# Patient Record
Sex: MALE | Race: OTHER | HISPANIC OR LATINO
[De-identification: names, ages, dates, MRNs, and addresses within clinical notes are randomized per-mention and may not be internally consistent; named-entity substitution may affect disease eponyms.]

---

## 2017-01-20 ENCOUNTER — MEDICATION RENEWAL (OUTPATIENT)
Age: 57
End: 2017-01-20

## 2017-02-06 ENCOUNTER — APPOINTMENT (OUTPATIENT)
Dept: INTERNAL MEDICINE | Facility: CLINIC | Age: 57
End: 2017-02-06

## 2017-02-06 ENCOUNTER — OUTPATIENT (OUTPATIENT)
Dept: OUTPATIENT SERVICES | Facility: HOSPITAL | Age: 57
LOS: 1 days | End: 2017-02-06
Payer: MEDICAID

## 2017-02-06 ENCOUNTER — RESULT CHARGE (OUTPATIENT)
Age: 57
End: 2017-02-06

## 2017-02-06 VITALS
HEART RATE: 76 BPM | WEIGHT: 192 LBS | BODY MASS INDEX: 26.78 KG/M2 | SYSTOLIC BLOOD PRESSURE: 138 MMHG | DIASTOLIC BLOOD PRESSURE: 70 MMHG

## 2017-02-06 VITALS — SYSTOLIC BLOOD PRESSURE: 125 MMHG | DIASTOLIC BLOOD PRESSURE: 80 MMHG

## 2017-02-06 DIAGNOSIS — I10 ESSENTIAL (PRIMARY) HYPERTENSION: ICD-10-CM

## 2017-02-06 LAB — HBA1C MFR BLD HPLC: 8.1

## 2017-02-06 PROCEDURE — 83036 HEMOGLOBIN GLYCOSYLATED A1C: CPT

## 2017-02-06 PROCEDURE — G0463: CPT

## 2017-02-08 DIAGNOSIS — E78.5 HYPERLIPIDEMIA, UNSPECIFIED: ICD-10-CM

## 2017-02-08 DIAGNOSIS — Z00.00 ENCOUNTER FOR GENERAL ADULT MEDICAL EXAMINATION WITHOUT ABNORMAL FINDINGS: ICD-10-CM

## 2017-02-08 DIAGNOSIS — K21.9 GASTRO-ESOPHAGEAL REFLUX DISEASE WITHOUT ESOPHAGITIS: ICD-10-CM

## 2017-02-08 DIAGNOSIS — E66.3 OVERWEIGHT: ICD-10-CM

## 2017-02-08 DIAGNOSIS — E11.9 TYPE 2 DIABETES MELLITUS WITHOUT COMPLICATIONS: ICD-10-CM

## 2017-03-23 ENCOUNTER — OUTPATIENT (OUTPATIENT)
Dept: OUTPATIENT SERVICES | Facility: HOSPITAL | Age: 57
LOS: 1 days | End: 2017-03-23
Payer: COMMERCIAL

## 2017-03-23 ENCOUNTER — APPOINTMENT (OUTPATIENT)
Dept: INTERNAL MEDICINE | Facility: CLINIC | Age: 57
End: 2017-03-23

## 2017-03-23 VITALS
HEART RATE: 79 BPM | BODY MASS INDEX: 27.34 KG/M2 | DIASTOLIC BLOOD PRESSURE: 79 MMHG | SYSTOLIC BLOOD PRESSURE: 130 MMHG | WEIGHT: 196 LBS

## 2017-03-23 DIAGNOSIS — I10 ESSENTIAL (PRIMARY) HYPERTENSION: ICD-10-CM

## 2017-03-23 PROCEDURE — G0463: CPT

## 2017-03-24 DIAGNOSIS — E11.9 TYPE 2 DIABETES MELLITUS WITHOUT COMPLICATIONS: ICD-10-CM

## 2017-06-05 ENCOUNTER — MEDICATION RENEWAL (OUTPATIENT)
Age: 57
End: 2017-06-05

## 2017-06-08 ENCOUNTER — APPOINTMENT (OUTPATIENT)
Dept: INTERNAL MEDICINE | Facility: CLINIC | Age: 57
End: 2017-06-08

## 2017-06-08 ENCOUNTER — OUTPATIENT (OUTPATIENT)
Dept: OUTPATIENT SERVICES | Facility: HOSPITAL | Age: 57
LOS: 1 days | End: 2017-06-08
Payer: COMMERCIAL

## 2017-06-08 ENCOUNTER — RESULT CHARGE (OUTPATIENT)
Age: 57
End: 2017-06-08

## 2017-06-08 VITALS
HEART RATE: 86 BPM | BODY MASS INDEX: 27.89 KG/M2 | SYSTOLIC BLOOD PRESSURE: 132 MMHG | WEIGHT: 200 LBS | OXYGEN SATURATION: 98 % | DIASTOLIC BLOOD PRESSURE: 74 MMHG

## 2017-06-08 DIAGNOSIS — Z87.19 PERSONAL HISTORY OF OTHER DISEASES OF THE DIGESTIVE SYSTEM: ICD-10-CM

## 2017-06-08 DIAGNOSIS — I10 ESSENTIAL (PRIMARY) HYPERTENSION: ICD-10-CM

## 2017-06-08 LAB — HBA1C MFR BLD HPLC: 7.5

## 2017-06-08 PROCEDURE — 83036 HEMOGLOBIN GLYCOSYLATED A1C: CPT

## 2017-06-08 PROCEDURE — G0463: CPT

## 2017-06-19 DIAGNOSIS — E78.5 HYPERLIPIDEMIA, UNSPECIFIED: ICD-10-CM

## 2017-06-19 DIAGNOSIS — E11.9 TYPE 2 DIABETES MELLITUS WITHOUT COMPLICATIONS: ICD-10-CM

## 2017-06-22 ENCOUNTER — OUTPATIENT (OUTPATIENT)
Dept: OUTPATIENT SERVICES | Facility: HOSPITAL | Age: 57
LOS: 1 days | End: 2017-06-22

## 2017-06-22 ENCOUNTER — APPOINTMENT (OUTPATIENT)
Dept: OPHTHALMOLOGY | Facility: CLINIC | Age: 57
End: 2017-06-22

## 2017-08-03 DIAGNOSIS — E13.9 OTHER SPECIFIED DIABETES MELLITUS WITHOUT COMPLICATIONS: ICD-10-CM

## 2017-10-17 ENCOUNTER — MEDICATION RENEWAL (OUTPATIENT)
Age: 57
End: 2017-10-17

## 2017-12-19 ENCOUNTER — MEDICATION RENEWAL (OUTPATIENT)
Age: 57
End: 2017-12-19

## 2018-01-02 ENCOUNTER — CHART COPY (OUTPATIENT)
Age: 58
End: 2018-01-02

## 2018-01-05 ENCOUNTER — LABORATORY RESULT (OUTPATIENT)
Age: 58
End: 2018-01-05

## 2018-01-05 ENCOUNTER — APPOINTMENT (OUTPATIENT)
Dept: INTERNAL MEDICINE | Facility: CLINIC | Age: 58
End: 2018-01-05

## 2018-01-05 ENCOUNTER — RESULT CHARGE (OUTPATIENT)
Age: 58
End: 2018-01-05

## 2018-01-05 ENCOUNTER — OUTPATIENT (OUTPATIENT)
Dept: OUTPATIENT SERVICES | Facility: HOSPITAL | Age: 58
LOS: 1 days | End: 2018-01-05
Payer: COMMERCIAL

## 2018-01-05 VITALS
SYSTOLIC BLOOD PRESSURE: 130 MMHG | BODY MASS INDEX: 27.02 KG/M2 | HEIGHT: 71 IN | DIASTOLIC BLOOD PRESSURE: 80 MMHG | WEIGHT: 193 LBS

## 2018-01-05 DIAGNOSIS — Z86.69 PERSONAL HISTORY OF OTHER DISEASES OF THE NERVOUS SYSTEM AND SENSE ORGANS: ICD-10-CM

## 2018-01-05 DIAGNOSIS — I10 ESSENTIAL (PRIMARY) HYPERTENSION: ICD-10-CM

## 2018-01-05 LAB
CREAT ?TM UR-MCNC: 55 MG/DL — SIGNIFICANT CHANGE UP
MICROALBUMIN UR-MCNC: 1.3 MG/DL — SIGNIFICANT CHANGE UP
MICROALBUMIN/CREAT UR-RTO: 24 MG/G — SIGNIFICANT CHANGE UP (ref 0–30)

## 2018-01-05 PROCEDURE — 83036 HEMOGLOBIN GLYCOSYLATED A1C: CPT

## 2018-01-05 PROCEDURE — G0463: CPT

## 2018-01-05 PROCEDURE — 82043 UR ALBUMIN QUANTITATIVE: CPT

## 2018-01-11 DIAGNOSIS — E11.9 TYPE 2 DIABETES MELLITUS WITHOUT COMPLICATIONS: ICD-10-CM

## 2018-01-11 DIAGNOSIS — E78.5 HYPERLIPIDEMIA, UNSPECIFIED: ICD-10-CM

## 2018-06-04 ENCOUNTER — RX RENEWAL (OUTPATIENT)
Age: 58
End: 2018-06-04

## 2018-06-22 ENCOUNTER — RX RENEWAL (OUTPATIENT)
Age: 58
End: 2018-06-22

## 2018-10-02 ENCOUNTER — RX RENEWAL (OUTPATIENT)
Age: 58
End: 2018-10-02

## 2018-10-24 ENCOUNTER — RX RENEWAL (OUTPATIENT)
Age: 58
End: 2018-10-24

## 2018-11-09 ENCOUNTER — OUTPATIENT (OUTPATIENT)
Dept: OUTPATIENT SERVICES | Facility: HOSPITAL | Age: 58
LOS: 1 days | End: 2018-11-09
Payer: COMMERCIAL

## 2018-11-09 ENCOUNTER — APPOINTMENT (OUTPATIENT)
Dept: INTERNAL MEDICINE | Facility: CLINIC | Age: 58
End: 2018-11-09

## 2018-11-09 ENCOUNTER — RESULT CHARGE (OUTPATIENT)
Age: 58
End: 2018-11-09

## 2018-11-09 VITALS
HEIGHT: 70 IN | DIASTOLIC BLOOD PRESSURE: 70 MMHG | SYSTOLIC BLOOD PRESSURE: 130 MMHG | WEIGHT: 200 LBS | BODY MASS INDEX: 28.63 KG/M2

## 2018-11-09 DIAGNOSIS — Z00.00 ENCOUNTER FOR GENERAL ADULT MEDICAL EXAMINATION W/OUT ABNORMAL FINDINGS: ICD-10-CM

## 2018-11-09 DIAGNOSIS — I10 ESSENTIAL (PRIMARY) HYPERTENSION: ICD-10-CM

## 2018-11-09 LAB — HBA1C MFR BLD HPLC: 7.2

## 2018-11-09 PROCEDURE — G0463: CPT

## 2018-11-09 PROCEDURE — 83036 HEMOGLOBIN GLYCOSYLATED A1C: CPT

## 2018-11-09 RX ORDER — ASPIRIN ENTERIC COATED TABLETS 81 MG 81 MG/1
81 TABLET, DELAYED RELEASE ORAL DAILY
Qty: 30 | Refills: 3 | Status: DISCONTINUED | COMMUNITY
Start: 2018-01-05 | End: 2018-11-09

## 2018-11-09 RX ORDER — GLIMEPIRIDE 4 MG/1
4 TABLET ORAL DAILY
Qty: 30 | Refills: 2 | Status: DISCONTINUED | COMMUNITY
Start: 2017-02-06 | End: 2018-11-09

## 2018-11-09 NOTE — PHYSICAL EXAM
[No Acute Distress] : no acute distress [Well-Appearing] : well-appearing [Normal Sclera/Conjunctiva] : normal sclera/conjunctiva [PERRL] : pupils equal round and reactive to light [EOMI] : extraocular movements intact [Normal Outer Ear/Nose] : the outer ears and nose were normal in appearance [Normal Oropharynx] : the oropharynx was normal [Supple] : supple [Clear to Auscultation] : lungs were clear to auscultation bilaterally [Normal Rate] : normal rate  [Regular Rhythm] : with a regular rhythm [Normal S1, S2] : normal S1 and S2 [No Murmur] : no murmur heard [Pedal Pulses Present] : the pedal pulses are present [No Edema] : there was no peripheral edema [Soft] : abdomen soft [Non Tender] : non-tender [Non-distended] : non-distended [Normal Supraclavicular Nodes] : no supraclavicular lymphadenopathy [Normal Posterior Cervical Nodes] : no posterior cervical lymphadenopathy [Normal Anterior Cervical Nodes] : no anterior cervical lymphadenopathy [No CVA Tenderness] : no CVA  tenderness [No Joint Swelling] : no joint swelling [Grossly Normal Strength/Tone] : grossly normal strength/tone [No Skin Lesions] : no skin lesions [Normal Gait] : normal gait [Coordination Grossly Intact] : coordination grossly intact [No Focal Deficits] : no focal deficits [Normal Affect] : the affect was normal [Alert and Oriented x3] : oriented to person, place, and time [Normal Mood] : the mood was normal [Comprehensive Foot Exam Normal] : Right and left foot were examined and both feet are normal. No ulcers in either foot. Toes are normal and with full ROM.  Normal tactile sensation with monofilament testing throughout both feet

## 2018-11-10 NOTE — REVIEW OF SYSTEMS
[Negative] : Heme/Lymph [Recent Change In Weight] : ~T no recent weight change [Vision Problems] : no vision problems [Chest Pain] : no chest pain [Palpitations] : no palpitations [Dyspnea on Exertion] : not dyspnea on exertion [Abdominal Pain] : no abdominal pain [Nausea] : no nausea [Diarrhea] : no diarrhea [Vomiting] : no vomiting [Dysuria] : no dysuria [Frequency] : no frequency [Joint Pain] : no joint pain [Skin Rash] : no skin rash [Dizziness] : no dizziness [Unsteady Walk] : no ataxia

## 2018-11-10 NOTE — HISTORY OF PRESENT ILLNESS
[FreeTextEntry1] : 58M with PMH of T2DM, GERD, and HLD presenting for diabetes follow-up.  [de-identified] : Patient was last seen in clinic 1/5/18. He presents today for his diabetes follow-up and states he has been doing well. Currently is taking metformin 1000mg BID and glimepiride 2mg daily. FS range from 110-140 in the morning before breakfast. Reports taking his medications as prescribed. Tries to eat a healthy diet and states he is physically active during the day as part of his job (works as , , etc.). Saw an ophthalmologist last year and was told everything was normal. Has not had any changes in vision  recently. He denies numbness or paresthesias in his feet, and regularly checks his feet for cuts/lesions. States he has seen Podiatry in the past.

## 2018-11-13 DIAGNOSIS — E11.9 TYPE 2 DIABETES MELLITUS WITHOUT COMPLICATIONS: ICD-10-CM

## 2018-11-13 DIAGNOSIS — E78.5 HYPERLIPIDEMIA, UNSPECIFIED: ICD-10-CM

## 2019-04-26 ENCOUNTER — OUTPATIENT (OUTPATIENT)
Dept: OUTPATIENT SERVICES | Facility: HOSPITAL | Age: 59
LOS: 1 days | End: 2019-04-26
Payer: COMMERCIAL

## 2019-04-26 ENCOUNTER — APPOINTMENT (OUTPATIENT)
Dept: INTERNAL MEDICINE | Facility: CLINIC | Age: 59
End: 2019-04-26
Payer: COMMERCIAL

## 2019-04-26 VITALS
WEIGHT: 198 LBS | OXYGEN SATURATION: 98 % | HEIGHT: 70 IN | SYSTOLIC BLOOD PRESSURE: 132 MMHG | DIASTOLIC BLOOD PRESSURE: 80 MMHG | BODY MASS INDEX: 28.35 KG/M2 | HEART RATE: 80 BPM

## 2019-04-26 DIAGNOSIS — I10 ESSENTIAL (PRIMARY) HYPERTENSION: ICD-10-CM

## 2019-04-26 LAB — HBA1C MFR BLD HPLC: 7.4

## 2019-04-26 PROCEDURE — 99213 OFFICE O/P EST LOW 20 MIN: CPT | Mod: GE

## 2019-04-26 PROCEDURE — G0463: CPT

## 2019-04-26 NOTE — PHYSICAL EXAM
[No Acute Distress] : no acute distress [PERRL] : pupils equal round and reactive to light [Normal Sclera/Conjunctiva] : normal sclera/conjunctiva [Well-Appearing] : well-appearing [EOMI] : extraocular movements intact [Normal Outer Ear/Nose] : the outer ears and nose were normal in appearance [Normal Oropharynx] : the oropharynx was normal [Clear to Auscultation] : lungs were clear to auscultation bilaterally [No Respiratory Distress] : no respiratory distress  [Supple] : supple [No Accessory Muscle Use] : no accessory muscle use [Normal Rate] : normal rate  [Regular Rhythm] : with a regular rhythm [Normal S1, S2] : normal S1 and S2 [Soft] : abdomen soft [Non Tender] : non-tender [Normal Bowel Sounds] : normal bowel sounds [Grossly Normal Strength/Tone] : grossly normal strength/tone [No Spinal Tenderness] : no spinal tenderness [No Rash] : no rash [Normal Gait] : normal gait [Normal Affect] : the affect was normal [Alert and Oriented x3] : oriented to person, place, and time [No Focal Deficits] : no focal deficits [] : both feet

## 2019-04-27 LAB
ALBUMIN SERPL ELPH-MCNC: 4.8 G/DL
ALP BLD-CCNC: 84 U/L
ALT SERPL-CCNC: 21 U/L
ANION GAP SERPL CALC-SCNC: 14 MMOL/L
AST SERPL-CCNC: 21 U/L
BASOPHILS # BLD AUTO: 0.05 K/UL
BASOPHILS NFR BLD AUTO: 0.6 %
BILIRUB SERPL-MCNC: 0.8 MG/DL
BUN SERPL-MCNC: 10 MG/DL
CALCIUM SERPL-MCNC: 9.9 MG/DL
CHLORIDE SERPL-SCNC: 101 MMOL/L
CHOLEST SERPL-MCNC: 123 MG/DL
CHOLEST/HDLC SERPL: 3.2 RATIO
CO2 SERPL-SCNC: 27 MMOL/L
CREAT SERPL-MCNC: 0.84 MG/DL
CREAT SPEC-SCNC: 78 MG/DL
EOSINOPHIL # BLD AUTO: 0.21 K/UL
EOSINOPHIL NFR BLD AUTO: 2.6 %
GLUCOSE SERPL-MCNC: 137 MG/DL
HCT VFR BLD CALC: 45.2 %
HDLC SERPL-MCNC: 39 MG/DL
HGB BLD-MCNC: 14.7 G/DL
IMM GRANULOCYTES NFR BLD AUTO: 0.3 %
LDLC SERPL CALC-MCNC: 57 MG/DL
LYMPHOCYTES # BLD AUTO: 1.82 K/UL
LYMPHOCYTES NFR BLD AUTO: 22.9 %
MAN DIFF?: NORMAL
MCHC RBC-ENTMCNC: 28.6 PG
MCHC RBC-ENTMCNC: 32.5 GM/DL
MCV RBC AUTO: 87.9 FL
MICROALBUMIN 24H UR DL<=1MG/L-MCNC: <1.2 MG/DL
MICROALBUMIN/CREAT 24H UR-RTO: NORMAL MG/G
MONOCYTES # BLD AUTO: 0.86 K/UL
MONOCYTES NFR BLD AUTO: 10.8 %
NEUTROPHILS # BLD AUTO: 5 K/UL
NEUTROPHILS NFR BLD AUTO: 62.8 %
PLATELET # BLD AUTO: 236 K/UL
POTASSIUM SERPL-SCNC: 4.6 MMOL/L
PROT SERPL-MCNC: 7.1 G/DL
RBC # BLD: 5.14 M/UL
RBC # FLD: 12.9 %
SODIUM SERPL-SCNC: 142 MMOL/L
TRIGL SERPL-MCNC: 133 MG/DL
TSH SERPL-ACNC: 1.81 UIU/ML
WBC # FLD AUTO: 7.96 K/UL

## 2019-04-30 NOTE — ASSESSMENT
[FreeTextEntry1] : Patient is a 58 y/o M w/ a PMHx of T2DM, GERD, and HLD who presents to clinic for follow-up.\par \par # T2DM\par - POCT Hemoglobin A1c was checked today and it was 7.4% (Last = 7.2% on 11/9/18)\par - Will increase patient's Glimepiride from 2mg QAM to 3mg QAM. Advised patient to monitor closely for hypoglycemia symptoms given this medication increase and to notify the clinic if this occurs. C/w Metformin 1000mg BID\par - Lifestyle modifications were encouraged, such as decreasing carbs/sugars from his diet and adding physical activity as tolerated to his daily routine\par - Patient due for annual eye exam --> Opthalmology referral provided\par - Will check urine microalbumin/creatinine ratio. Will also check a BMP today to monitor patient's renal function\par - Requested that patient RTC in 3 months in order to repeat his Hemoglobin A1c\par \par # HLD\par - C/w Atorvastatin 40mg QHS\par - Will check a Lipid profile today\par \par # HM\par - Per chart review, patient has not had lab work drawn since 8/2016. Will check CBC, CMP, Lipid profile, and TSH today\par - Medication refills provided per request\par - Patient UTD w/ Tdap\par \par D/w Dr. White\par \par RTC in 3 months for CPE

## 2019-04-30 NOTE — REVIEW OF SYSTEMS
[Fever] : no fever [Chills] : no chills [Pain] : no pain [Vision Problems] : no vision problems [Nasal Discharge] : no nasal discharge [Sore Throat] : no sore throat [Chest Pain] : no chest pain [Palpitations] : no palpitations [Shortness Of Breath] : no shortness of breath [Dyspnea on Exertion] : not dyspnea on exertion [Abdominal Pain] : no abdominal pain [Vomiting] : no vomiting [Dysuria] : no dysuria [Incontinence] : no incontinence [Joint Pain] : no joint pain [Back Pain] : no back pain [Itching] : no itching [Skin Rash] : no skin rash [Headache] : no headache [Confusion] : no confusion

## 2019-04-30 NOTE — HISTORY OF PRESENT ILLNESS
[de-identified] : Patient is a 60 y/o M w/ a PMHx of T2DM, GERD, and HLD who presents to clinic for follow-up.\par \par Patient was last seen in clinic on 11/9/18. His Hemoglobin A1c was found to be 7.2% at that time. He was recommended to increase his Glimepiride from 2mg QD to 2mg BID and to continue Metformin 1000mg BID.\par \par Today, patient reports that he never switched his diabetes medication regimen since his last visit. He has still been taking Glimepiride 2mg QD and Metformin 1000mg BID. He states that he has been adherent to this regimen and he denies any associated side effects. Patient reports that he checks his FS once a day in the AM (fasting). He did not bring in a recording of his FS to today's appointment, but he reports that it is usually 130s-140s, though it can occasionally be 160-170. Patient denies any complaints of dizziness/lightheadedness, vision changes, chest pain, palpitations, shortness of breath, KOVACS, nausea, vomiting, diarrhea, or abdominal pain. He currently works in [employer] and reports that he is usually active at work. He states that he does not do much exercise outside of work, but hopes to improve on this as the weather becomes warmer. He is also working on improving his diet. Patient is due for an annual eye exam. He requests medication refills today.

## 2019-05-02 DIAGNOSIS — E11.9 TYPE 2 DIABETES MELLITUS WITHOUT COMPLICATIONS: ICD-10-CM

## 2019-05-02 DIAGNOSIS — E78.5 HYPERLIPIDEMIA, UNSPECIFIED: ICD-10-CM

## 2019-11-21 ENCOUNTER — RX RENEWAL (OUTPATIENT)
Age: 59
End: 2019-11-21

## 2020-01-08 ENCOUNTER — LABORATORY RESULT (OUTPATIENT)
Age: 60
End: 2020-01-08

## 2020-01-08 ENCOUNTER — APPOINTMENT (OUTPATIENT)
Dept: INTERNAL MEDICINE | Facility: CLINIC | Age: 60
End: 2020-01-08
Payer: MEDICAID

## 2020-01-08 ENCOUNTER — OUTPATIENT (OUTPATIENT)
Dept: OUTPATIENT SERVICES | Facility: HOSPITAL | Age: 60
LOS: 1 days | End: 2020-01-08
Payer: MEDICAID

## 2020-01-08 VITALS
DIASTOLIC BLOOD PRESSURE: 70 MMHG | SYSTOLIC BLOOD PRESSURE: 132 MMHG | WEIGHT: 193 LBS | BODY MASS INDEX: 27.63 KG/M2 | HEIGHT: 70 IN

## 2020-01-08 DIAGNOSIS — B35.1 TINEA UNGUIUM: ICD-10-CM

## 2020-01-08 DIAGNOSIS — I10 ESSENTIAL (PRIMARY) HYPERTENSION: ICD-10-CM

## 2020-01-08 PROCEDURE — G0463: CPT | Mod: 25

## 2020-01-08 PROCEDURE — 85027 COMPLETE CBC AUTOMATED: CPT

## 2020-01-08 PROCEDURE — 90688 IIV4 VACCINE SPLT 0.5 ML IM: CPT

## 2020-01-08 PROCEDURE — G0008: CPT

## 2020-01-08 PROCEDURE — 99213 OFFICE O/P EST LOW 20 MIN: CPT | Mod: GE

## 2020-01-08 PROCEDURE — 82043 UR ALBUMIN QUANTITATIVE: CPT

## 2020-01-08 PROCEDURE — 87389 HIV-1 AG W/HIV-1&-2 AB AG IA: CPT

## 2020-01-08 NOTE — PLAN
[FreeTextEntry1] : Podiatry referral for onychomycosis\par Ophthalmology referral for eye exam\par CBC/CMP\par A1c check today\par Flu shot today\par HIV screen\par Urine microalbumin/creatinine\par Referral for colonoscopy given, will need repeat this year as last was in 2020.\par Medications renewed.\par \par Next visit may discuss zoster vaccine, as well as lipids as patient not fasting today.\par \par RTC in 6 months or sooner with complaints.

## 2020-01-08 NOTE — REVIEW OF SYSTEMS
[Postnasal Drip] : postnasal drip [Chills] : no chills [Fever] : no fever [Recent Change In Weight] : ~T no recent weight change [Fatigue] : no fatigue [Hearing Loss] : no hearing loss [Vision Problems] : no vision problems [Chest Pain] : no chest pain [Lower Ext Edema] : no lower extremity edema [Palpitations] : no palpitations [Shortness Of Breath] : no shortness of breath [Wheezing] : no wheezing [Cough] : no cough [Abdominal Pain] : no abdominal pain [Dyspnea on Exertion] : not dyspnea on exertion [Nausea] : no nausea [Constipation] : no constipation [Vomiting] : no vomiting [Diarrhea] : no diarrhea [Melena] : no melena [Dysuria] : no dysuria [Joint Pain] : no joint pain [Headache] : no headache

## 2020-01-08 NOTE — HISTORY OF PRESENT ILLNESS
[FreeTextEntry1] : CPE [de-identified] : Sr. Burciaga is a 59 year old gentleman from [facility] who has DM2 (A1c 7.4% most recently), HLD, who presents for annual physical. He has no complaints today. No burning in feet, no wounds, no vision changes, uses glasses only for reading, no CP/SOB. No abdominal complaints, no joint pain. He last had colonoscopy in 2010, never had HIV screening. Requesting flu shot today. We discussed his diet, he eats typical [place] fare, meat 1-2x/week, rice, etc. Drinks water. Tolerates physically rigorous job without any issues, makes him feel good. 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 Never smoked, quit drinking 15 years ago, previously a few drinks per week. Lives with wife, kids. Plans to travel to [facility] in a week.

## 2020-01-08 NOTE — PHYSICAL EXAM
[No Acute Distress] : no acute distress [Well Nourished] : well nourished [Normal Sclera/Conjunctiva] : normal sclera/conjunctiva [EOMI] : extraocular movements intact [Normal Outer Ear/Nose] : the outer ears and nose were normal in appearance [Normal Oropharynx] : the oropharynx was normal [No Lymphadenopathy] : no lymphadenopathy [Supple] : supple [No Respiratory Distress] : no respiratory distress  [Clear to Auscultation] : lungs were clear to auscultation bilaterally [Normal Rate] : normal rate  [Regular Rhythm] : with a regular rhythm [Normal S1, S2] : normal S1 and S2 [No Murmur] : no murmur heard [Soft] : abdomen soft [Non-distended] : non-distended [Non Tender] : non-tender [Coordination Grossly Intact] : coordination grossly intact [No Focal Deficits] : no focal deficits [Normal Gait] : normal gait [Normal Affect] : the affect was normal [Normal Insight/Judgement] : insight and judgment were intact [] : normal [___] : left foot points [unfilled] [de-identified] : patient with acral hyperkeratosis/xerosis on soles [de-identified] : diminished on heels although may be 2/2 thickened skin

## 2020-01-08 NOTE — ASSESSMENT
[FreeTextEntry1] : 59M DM2 (A1c 7.4%), HLD, presenting for CPE. He is otherwise well. Foot exam performed today, wnl except heels slightly diminished, may be 2/2 thickened skin. Otherwise no complaints. Diet, exercise discussed, patient doing well.

## 2020-01-08 NOTE — HEALTH RISK ASSESSMENT
[No] : No [0] : 2) Feeling down, depressed, or hopeless: Not at all (0) [HIV Test offered] : HIV Test offered [With Family] : lives with family [Employed] : employed [Fully functional (bathing, dressing, toileting, transferring, walking, feeding)] : Fully functional (bathing, dressing, toileting, transferring, walking, feeding) [Fully functional (using the telephone, shopping, preparing meals, housekeeping, doing laundry, using] : Fully functional and needs no help or supervision to perform IADLs (using the telephone, shopping, preparing meals, housekeeping, doing laundry, using transportation, managing medications and managing finances) [] : No [Reports changes in vision] : Reports no changes in vision

## 2020-01-09 LAB
CREAT ?TM UR-MCNC: 83 MG/DL — SIGNIFICANT CHANGE UP
HCT VFR BLD CALC: 46.6 % — SIGNIFICANT CHANGE UP (ref 39–50)
HGB BLD-MCNC: 15.2 G/DL — SIGNIFICANT CHANGE UP (ref 13–17)
HIV 1+2 AB+HIV1 P24 AG SERPL QL IA: SIGNIFICANT CHANGE UP
MCHC RBC-ENTMCNC: 29 PG — SIGNIFICANT CHANGE UP (ref 27–34)
MCHC RBC-ENTMCNC: 32.6 GM/DL — SIGNIFICANT CHANGE UP (ref 32–36)
MCV RBC AUTO: 88.8 FL — SIGNIFICANT CHANGE UP (ref 80–100)
MICROALBUMIN UR-MCNC: 2.2 MG/DL — SIGNIFICANT CHANGE UP
MICROALBUMIN/CREAT UR-RTO: 26 MG/G — SIGNIFICANT CHANGE UP (ref 0–30)
PLATELET # BLD AUTO: 273 K/UL — SIGNIFICANT CHANGE UP (ref 150–400)
RBC # BLD: 5.25 M/UL — SIGNIFICANT CHANGE UP (ref 4.2–5.8)
RBC # FLD: 12.9 % — SIGNIFICANT CHANGE UP (ref 10.3–14.5)
WBC # BLD: 8.61 K/UL — SIGNIFICANT CHANGE UP (ref 3.8–10.5)
WBC # FLD AUTO: 8.61 K/UL — SIGNIFICANT CHANGE UP (ref 3.8–10.5)

## 2020-01-15 DIAGNOSIS — E11.9 TYPE 2 DIABETES MELLITUS WITHOUT COMPLICATIONS: ICD-10-CM

## 2020-01-15 DIAGNOSIS — Z23 ENCOUNTER FOR IMMUNIZATION: ICD-10-CM

## 2020-01-15 DIAGNOSIS — B35.1 TINEA UNGUIUM: ICD-10-CM

## 2020-07-01 ENCOUNTER — RX RENEWAL (OUTPATIENT)
Age: 60
End: 2020-07-01

## 2020-08-03 RX ORDER — BLOOD-GLUCOSE METER
W/DEVICE EACH MISCELLANEOUS
Qty: 1 | Refills: 0 | Status: ACTIVE | COMMUNITY
Start: 2020-08-03 | End: 1900-01-01

## 2020-08-03 RX ORDER — LANCETS 28 GAUGE
EACH MISCELLANEOUS
Qty: 2 | Refills: 0 | Status: ACTIVE | COMMUNITY
Start: 2020-08-03 | End: 1900-01-01

## 2020-11-25 ENCOUNTER — RX RENEWAL (OUTPATIENT)
Age: 60
End: 2020-11-25

## 2020-11-30 ENCOUNTER — APPOINTMENT (OUTPATIENT)
Dept: INTERNAL MEDICINE | Facility: CLINIC | Age: 60
End: 2020-11-30
Payer: MEDICAID

## 2020-11-30 ENCOUNTER — APPOINTMENT (OUTPATIENT)
Dept: INTERNAL MEDICINE | Facility: CLINIC | Age: 60
End: 2020-11-30

## 2020-11-30 ENCOUNTER — INPATIENT (INPATIENT)
Facility: HOSPITAL | Age: 60
LOS: 4 days | Discharge: HOME | DRG: 445 | End: 2020-12-05
Attending: SURGERY | Admitting: SURGERY
Payer: MEDICAID

## 2020-11-30 ENCOUNTER — OUTPATIENT (OUTPATIENT)
Dept: OUTPATIENT SERVICES | Facility: HOSPITAL | Age: 60
LOS: 1 days | End: 2020-11-30
Payer: MEDICAID

## 2020-11-30 ENCOUNTER — RESULT CHARGE (OUTPATIENT)
Age: 60
End: 2020-11-30

## 2020-11-30 VITALS
OXYGEN SATURATION: 97 % | HEIGHT: 71 IN | DIASTOLIC BLOOD PRESSURE: 80 MMHG | WEIGHT: 195.11 LBS | TEMPERATURE: 103 F | RESPIRATION RATE: 20 BRPM | HEART RATE: 105 BPM | SYSTOLIC BLOOD PRESSURE: 165 MMHG

## 2020-11-30 VITALS — OXYGEN SATURATION: 97 % | TEMPERATURE: 101 F | HEART RATE: 106 BPM

## 2020-11-30 VITALS
SYSTOLIC BLOOD PRESSURE: 130 MMHG | BODY MASS INDEX: 27.2 KG/M2 | DIASTOLIC BLOOD PRESSURE: 90 MMHG | WEIGHT: 190 LBS | HEIGHT: 70 IN

## 2020-11-30 DIAGNOSIS — I10 ESSENTIAL (PRIMARY) HYPERTENSION: ICD-10-CM

## 2020-11-30 LAB
ALBUMIN SERPL ELPH-MCNC: 3.8 G/DL — SIGNIFICANT CHANGE UP (ref 3.3–5)
ALP SERPL-CCNC: 369 U/L — HIGH (ref 40–120)
ALT FLD-CCNC: 261 U/L — HIGH (ref 10–45)
ANION GAP SERPL CALC-SCNC: 12 MMOL/L — SIGNIFICANT CHANGE UP (ref 5–17)
APPEARANCE UR: CLEAR — SIGNIFICANT CHANGE UP
AST SERPL-CCNC: 105 U/L — HIGH (ref 10–40)
BACTERIA # UR AUTO: NEGATIVE — SIGNIFICANT CHANGE UP
BASE EXCESS BLDV CALC-SCNC: 2.3 MMOL/L — HIGH (ref -2–2)
BASOPHILS # BLD AUTO: 0.02 K/UL — SIGNIFICANT CHANGE UP (ref 0–0.2)
BASOPHILS NFR BLD AUTO: 0.3 % — SIGNIFICANT CHANGE UP (ref 0–2)
BILIRUB SERPL-MCNC: 3.9 MG/DL — HIGH (ref 0.2–1.2)
BILIRUB UR-MCNC: ABNORMAL
BUN SERPL-MCNC: 9 MG/DL — SIGNIFICANT CHANGE UP (ref 7–23)
CA-I SERPL-SCNC: 1.06 MMOL/L — LOW (ref 1.12–1.3)
CALCIUM SERPL-MCNC: 8.6 MG/DL — SIGNIFICANT CHANGE UP (ref 8.4–10.5)
CHLORIDE BLDV-SCNC: 97 MMOL/L — SIGNIFICANT CHANGE UP (ref 96–108)
CHLORIDE SERPL-SCNC: 91 MMOL/L — LOW (ref 96–108)
CO2 BLDV-SCNC: 28 MMOL/L — SIGNIFICANT CHANGE UP (ref 22–30)
CO2 SERPL-SCNC: 23 MMOL/L — SIGNIFICANT CHANGE UP (ref 22–31)
COLOR SPEC: YELLOW — SIGNIFICANT CHANGE UP
CREAT SERPL-MCNC: 0.85 MG/DL — SIGNIFICANT CHANGE UP (ref 0.5–1.3)
DIFF PNL FLD: ABNORMAL
EOSINOPHIL # BLD AUTO: 0 K/UL — SIGNIFICANT CHANGE UP (ref 0–0.5)
EOSINOPHIL NFR BLD AUTO: 0 % — SIGNIFICANT CHANGE UP (ref 0–6)
EPI CELLS # UR: 0 /HPF — SIGNIFICANT CHANGE UP
GAS PNL BLDV: 125 MMOL/L — LOW (ref 135–145)
GAS PNL BLDV: SIGNIFICANT CHANGE UP
GLUCOSE BLDV-MCNC: 191 MG/DL — HIGH (ref 70–99)
GLUCOSE SERPL-MCNC: 191 MG/DL — HIGH (ref 70–99)
GLUCOSE UR QL: NEGATIVE — SIGNIFICANT CHANGE UP
HCO3 BLDV-SCNC: 26 MMOL/L — SIGNIFICANT CHANGE UP (ref 21–29)
HCT VFR BLD CALC: 39.3 % — SIGNIFICANT CHANGE UP (ref 39–50)
HCT VFR BLDA CALC: 42 % — SIGNIFICANT CHANGE UP (ref 39–50)
HGB BLD CALC-MCNC: 13.9 G/DL — SIGNIFICANT CHANGE UP (ref 13–17)
HGB BLD-MCNC: 13.4 G/DL — SIGNIFICANT CHANGE UP (ref 13–17)
IMM GRANULOCYTES NFR BLD AUTO: 0.4 % — SIGNIFICANT CHANGE UP (ref 0–1.5)
KETONES UR-MCNC: ABNORMAL
LACTATE BLDV-MCNC: 1.8 MMOL/L — SIGNIFICANT CHANGE UP (ref 0.7–2)
LEUKOCYTE ESTERASE UR-ACNC: NEGATIVE — SIGNIFICANT CHANGE UP
LIDOCAIN IGE QN: 41 U/L — SIGNIFICANT CHANGE UP (ref 7–60)
LYMPHOCYTES # BLD AUTO: 0.54 K/UL — LOW (ref 1–3.3)
LYMPHOCYTES # BLD AUTO: 7.1 % — LOW (ref 13–44)
MCHC RBC-ENTMCNC: 28.9 PG — SIGNIFICANT CHANGE UP (ref 27–34)
MCHC RBC-ENTMCNC: 34.1 GM/DL — SIGNIFICANT CHANGE UP (ref 32–36)
MCV RBC AUTO: 84.9 FL — SIGNIFICANT CHANGE UP (ref 80–100)
MONOCYTES # BLD AUTO: 0.72 K/UL — SIGNIFICANT CHANGE UP (ref 0–0.9)
MONOCYTES NFR BLD AUTO: 9.5 % — SIGNIFICANT CHANGE UP (ref 2–14)
NEUTROPHILS # BLD AUTO: 6.28 K/UL — SIGNIFICANT CHANGE UP (ref 1.8–7.4)
NEUTROPHILS NFR BLD AUTO: 82.7 % — HIGH (ref 43–77)
NITRITE UR-MCNC: NEGATIVE — SIGNIFICANT CHANGE UP
NRBC # BLD: 0 /100 WBCS — SIGNIFICANT CHANGE UP (ref 0–0)
PCO2 BLDV: 40 MMHG — SIGNIFICANT CHANGE UP (ref 35–50)
PH BLDV: 7.43 — SIGNIFICANT CHANGE UP (ref 7.35–7.45)
PH UR: 6 — SIGNIFICANT CHANGE UP (ref 5–8)
PLATELET # BLD AUTO: 212 K/UL — SIGNIFICANT CHANGE UP (ref 150–400)
PO2 BLDV: 27 MMHG — SIGNIFICANT CHANGE UP (ref 25–45)
POTASSIUM BLDV-SCNC: 3.6 MMOL/L — SIGNIFICANT CHANGE UP (ref 3.5–5.3)
POTASSIUM SERPL-MCNC: 3.7 MMOL/L — SIGNIFICANT CHANGE UP (ref 3.5–5.3)
POTASSIUM SERPL-SCNC: 3.7 MMOL/L — SIGNIFICANT CHANGE UP (ref 3.5–5.3)
PROT SERPL-MCNC: 6.9 G/DL — SIGNIFICANT CHANGE UP (ref 6–8.3)
PROT UR-MCNC: ABNORMAL
RBC # BLD: 4.63 M/UL — SIGNIFICANT CHANGE UP (ref 4.2–5.8)
RBC # FLD: 13.2 % — SIGNIFICANT CHANGE UP (ref 10.3–14.5)
RBC CASTS # UR COMP ASSIST: 1 /HPF — SIGNIFICANT CHANGE UP (ref 0–4)
SAO2 % BLDV: 44 % — LOW (ref 67–88)
SODIUM SERPL-SCNC: 126 MMOL/L — LOW (ref 135–145)
SP GR SPEC: 1.01 — SIGNIFICANT CHANGE UP (ref 1.01–1.02)
UROBILINOGEN FLD QL: NEGATIVE — SIGNIFICANT CHANGE UP
WBC # BLD: 7.59 K/UL — SIGNIFICANT CHANGE UP (ref 3.8–10.5)
WBC # FLD AUTO: 7.59 K/UL — SIGNIFICANT CHANGE UP (ref 3.8–10.5)
WBC UR QL: 0 /HPF — SIGNIFICANT CHANGE UP (ref 0–5)

## 2020-11-30 PROCEDURE — 71045 X-RAY EXAM CHEST 1 VIEW: CPT | Mod: 26

## 2020-11-30 PROCEDURE — 74177 CT ABD & PELVIS W/CONTRAST: CPT | Mod: 26

## 2020-11-30 PROCEDURE — 99214 OFFICE O/P EST MOD 30 MIN: CPT | Mod: GC

## 2020-11-30 PROCEDURE — 99285 EMERGENCY DEPT VISIT HI MDM: CPT

## 2020-11-30 PROCEDURE — G0463: CPT

## 2020-11-30 RX ORDER — SODIUM CHLORIDE 9 MG/ML
1000 INJECTION INTRAMUSCULAR; INTRAVENOUS; SUBCUTANEOUS ONCE
Refills: 0 | Status: COMPLETED | OUTPATIENT
Start: 2020-11-30 | End: 2020-11-30

## 2020-11-30 RX ORDER — PIPERACILLIN AND TAZOBACTAM 4; .5 G/20ML; G/20ML
3.38 INJECTION, POWDER, LYOPHILIZED, FOR SOLUTION INTRAVENOUS ONCE
Refills: 0 | Status: COMPLETED | OUTPATIENT
Start: 2020-11-30 | End: 2020-11-30

## 2020-11-30 RX ORDER — ACETAMINOPHEN 500 MG
975 TABLET ORAL ONCE
Refills: 0 | Status: COMPLETED | OUTPATIENT
Start: 2020-11-30 | End: 2020-11-30

## 2020-11-30 RX ADMIN — SODIUM CHLORIDE 1000 MILLILITER(S): 9 INJECTION INTRAMUSCULAR; INTRAVENOUS; SUBCUTANEOUS at 20:49

## 2020-11-30 RX ADMIN — PIPERACILLIN AND TAZOBACTAM 200 GRAM(S): 4; .5 INJECTION, POWDER, LYOPHILIZED, FOR SOLUTION INTRAVENOUS at 23:34

## 2020-11-30 RX ADMIN — Medication 975 MILLIGRAM(S): at 20:49

## 2020-11-30 RX ADMIN — SODIUM CHLORIDE 1000 MILLILITER(S): 9 INJECTION INTRAMUSCULAR; INTRAVENOUS; SUBCUTANEOUS at 23:34

## 2020-11-30 NOTE — ED CLERICAL - NS ED CLERK NOTE PRE-ARRIVAL INFORMATION; ADDITIONAL PRE-ARRIVAL INFORMATION
CC/Reason For referral: fever, tachy, r/o appendicitis. needs ct scan and labwork.  Preferred Consultant(if applicable):na  Who admits for you (if needed): na  Do you have documents you would like to fax over? na  Would you still like to speak to an ED attending? no

## 2020-11-30 NOTE — ED PROVIDER NOTE - ATTENDING CONTRIBUTION TO CARE
Patient presenting complaining of RLQ pains occasionally radiating to the lower back x approx 10 days.  Initially was severe associated with nausea, then self resolved to a low level, persisted there, then began having more abdominal pains and nausea today so went to urgent care - was sent here to r/o appsary.  Now reporting pains are more mild.  No diarrhea, no prior surgeries, no blood in urine.    A 14 point review of systems is negative except as in HPI or otherwise documented.    Exam:  General: Patient well appearing, vital signs within normal limits  HEENT: airway patent with moist mucous membranes  Cardiac: RRR S1/S2 with strong peripheral pulses  Respiratory: lungs clear without respiratory distress  GI: abdomen soft, non tender, non distended  : no CVA tenderness bilaterally  Neuro: no gross neurologic deficits  Skin: warm, well perfused  Psych: normal mood and affect    Patient presenting with one week of colicky RLQ pains, not reproducible on exam at this time, differential includes renal colic, less likely appendicitis - plan for labs, UA, CT A/P, re-eval

## 2020-11-30 NOTE — ED PROVIDER NOTE - OBJECTIVE STATEMENT
60 year old male PMH NIDDM, HLD, p/w abdominal pain. States onset 1 week ago, initially severe. Pain has been intermittent since, with periods of complete relief. Pain is RLQ, sharp, no aggravating or relieving factors. A/w nausea, chills, and anorexia. Went to his medicine clinic today and referred to ED for c/f appendicitis. No recent travel or sick contacts. Denies cp, sob, vomiting, diarrhea, constipation, dysuria, or weakness.

## 2020-11-30 NOTE — ED PROVIDER NOTE - NS ED ATTENDING STATEMENT MOD
Please forward results to anticoagulation clinic
I have personally seen and examined this patient.  I have fully participated in the care of this patient. I have reviewed all pertinent clinical information, including history, physical exam, plan and the Resident’s note and agree except as noted.

## 2020-11-30 NOTE — ED ADULT NURSE NOTE - NSIMPLEMENTINTERV_GEN_ALL_ED
Implemented All Universal Safety Interventions:  [place] to call system. Call bell, personal items and telephone within reach. Instruct patient to call for assistance. Room bathroom lighting operational. Non-slip footwear when patient is off stretcher. Physically safe environment: no spills, clutter or unnecessary equipment. Stretcher in lowest position, wheels locked, appropriate side rails in place.

## 2020-11-30 NOTE — ED PROVIDER NOTE - CLINICAL SUMMARY MEDICAL DECISION MAKING FREE TEXT BOX
Rosendo Leary, PGY2: 60 year old male p/w intermittent RLQ pain x1 week, a/w chills, nausea, and anorexia. Found to be febrile in ED. No recent travel or sick contacts. No localizing infectious sx otherwise. Abd soft NTND, urine noted to be dark. DDx: appy vs renal colic vs uti vs GI. Plan for labs, XR, CT A/P, UA/UCx, Tylenol, IVF. Patient well appearing and appears stable for d/c if workup negative.

## 2020-11-30 NOTE — ED PROVIDER NOTE - NS ED ROS FT
GENERAL: no fever, +chills  EYES: no change in vision, no irritation  HEENT: no trouble swallowing or speaking, no throat pain, no ear pain, no rhinorrhea  CARDIAC: no chest pain, no palpitations  PULMONARY: no cough, no shortness of breath, no wheezing  GI: +abdominal pain, +nausea, no vomiting, no diarrhea, no constipation, +anorexia  : no changes in urination, no dysuria, no frequency, no hematuria, no discharge  SKIN: no rashes  NEURO: no headache, no numbness, no weakness  MSK: no joint pain, no muscle pain, no back pain, no calf pain     -Rosendo Leary, PGY2

## 2020-11-30 NOTE — ED ADULT NURSE NOTE - OBJECTIVE STATEMENT
60y M PMHx DM & HLD presents to ED from primary care clinic c/o abdominal pain. States that he was having generalized abdominal discomfort associated with nausea but no vomiting & has been having normal BMs, last BM yesterday. Pt was sent here by clinic for r/o appendicitis. Denies CP, SOB, H/A, N/V/D, f/c, dizziness, & urinary symptoms. Upon presentation, pt febrile with all other VSS. A&Ox4. Lungs CTA & no respiratory distress noted on RA. EKG obtained & cardiac monitor applied. Abdomen firm, distended & RLQ mildly tender to palpation with no rebound tenderness. Skin warm, dry, & intact. MAEx4. No LE edema noted on exam. Pt resting comfortably with no complaints at this time. Pt's bed in the lowest position & explained POC to pt. Will continue to reassess.

## 2020-11-30 NOTE — ED PROVIDER NOTE - PHYSICAL EXAMINATION
GENERAL: A&Ox3, non-toxic appearing, no acute distress  HEENT: NCAT, EOMI, oral mucosa moist, normal conjunctiva  RESP: CTAB, no respiratory distress, no wheezes/rhonchi/rales, speaking in full sentences  CV: RRR, no murmurs/rubs/gallops  ABDOMEN: soft, non-tender, non-distended, no guarding, no rebound, no CVA tenderness  MSK: no visible deformities  NEURO: no focal sensory or motor deficits, MAEx4  SKIN: warm, normal color, well perfused, no rash  PSYCH: normal affect    -Rosendo Leary, PGY2

## 2020-12-01 DIAGNOSIS — K81.0 ACUTE CHOLECYSTITIS: ICD-10-CM

## 2020-12-01 LAB
ALBUMIN SERPL ELPH-MCNC: 3.1 G/DL — LOW (ref 3.3–5)
ALP SERPL-CCNC: 291 U/L — HIGH (ref 40–120)
ALT FLD-CCNC: 196 U/L — HIGH (ref 10–45)
ANION GAP SERPL CALC-SCNC: 10 MMOL/L — SIGNIFICANT CHANGE UP (ref 5–17)
AST SERPL-CCNC: 77 U/L — HIGH (ref 10–40)
BILIRUB DIRECT SERPL-MCNC: 2.2 MG/DL — HIGH (ref 0–0.2)
BILIRUB INDIRECT FLD-MCNC: 1 MG/DL — SIGNIFICANT CHANGE UP (ref 0.2–1)
BILIRUB SERPL-MCNC: 3.2 MG/DL — HIGH (ref 0.2–1.2)
BLD GP AB SCN SERPL QL: NEGATIVE — SIGNIFICANT CHANGE UP
BUN SERPL-MCNC: 7 MG/DL — SIGNIFICANT CHANGE UP (ref 7–23)
CALCIUM SERPL-MCNC: 8.5 MG/DL — SIGNIFICANT CHANGE UP (ref 8.4–10.5)
CHLORIDE SERPL-SCNC: 102 MMOL/L — SIGNIFICANT CHANGE UP (ref 96–108)
CO2 SERPL-SCNC: 22 MMOL/L — SIGNIFICANT CHANGE UP (ref 22–31)
CREAT SERPL-MCNC: 0.78 MG/DL — SIGNIFICANT CHANGE UP (ref 0.5–1.3)
CULTURE RESULTS: SIGNIFICANT CHANGE UP
GLUCOSE BLDC GLUCOMTR-MCNC: 118 MG/DL — HIGH (ref 70–99)
GLUCOSE BLDC GLUCOMTR-MCNC: 134 MG/DL — HIGH (ref 70–99)
GLUCOSE BLDC GLUCOMTR-MCNC: 141 MG/DL — HIGH (ref 70–99)
GLUCOSE BLDC GLUCOMTR-MCNC: 95 MG/DL — SIGNIFICANT CHANGE UP (ref 70–99)
GLUCOSE SERPL-MCNC: 143 MG/DL — HIGH (ref 70–99)
HCT VFR BLD CALC: 35.2 % — LOW (ref 39–50)
HGB BLD-MCNC: 12 G/DL — LOW (ref 13–17)
MAGNESIUM SERPL-MCNC: 2.2 MG/DL — SIGNIFICANT CHANGE UP (ref 1.6–2.6)
MCHC RBC-ENTMCNC: 28.8 PG — SIGNIFICANT CHANGE UP (ref 27–34)
MCHC RBC-ENTMCNC: 34.1 GM/DL — SIGNIFICANT CHANGE UP (ref 32–36)
MCV RBC AUTO: 84.4 FL — SIGNIFICANT CHANGE UP (ref 80–100)
NRBC # BLD: 0 /100 WBCS — SIGNIFICANT CHANGE UP (ref 0–0)
PHOSPHATE SERPL-MCNC: 2.6 MG/DL — SIGNIFICANT CHANGE UP (ref 2.5–4.5)
PLATELET # BLD AUTO: 180 K/UL — SIGNIFICANT CHANGE UP (ref 150–400)
POTASSIUM SERPL-MCNC: 3.6 MMOL/L — SIGNIFICANT CHANGE UP (ref 3.5–5.3)
POTASSIUM SERPL-SCNC: 3.6 MMOL/L — SIGNIFICANT CHANGE UP (ref 3.5–5.3)
PROT SERPL-MCNC: 5.5 G/DL — LOW (ref 6–8.3)
RBC # BLD: 4.17 M/UL — LOW (ref 4.2–5.8)
RBC # FLD: 13.3 % — SIGNIFICANT CHANGE UP (ref 10.3–14.5)
RH IG SCN BLD-IMP: POSITIVE — SIGNIFICANT CHANGE UP
SARS-COV-2 RNA SPEC QL NAA+PROBE: SIGNIFICANT CHANGE UP
SODIUM SERPL-SCNC: 134 MMOL/L — LOW (ref 135–145)
SPECIMEN SOURCE: SIGNIFICANT CHANGE UP
WBC # BLD: 5.28 K/UL — SIGNIFICANT CHANGE UP (ref 3.8–10.5)
WBC # FLD AUTO: 5.28 K/UL — SIGNIFICANT CHANGE UP (ref 3.8–10.5)

## 2020-12-01 PROCEDURE — 99222 1ST HOSP IP/OBS MODERATE 55: CPT | Mod: GC

## 2020-12-01 PROCEDURE — 74181 MRI ABDOMEN W/O CONTRAST: CPT | Mod: 26

## 2020-12-01 RX ORDER — HYDROMORPHONE HYDROCHLORIDE 2 MG/ML
0.5 INJECTION INTRAMUSCULAR; INTRAVENOUS; SUBCUTANEOUS EVERY 6 HOURS
Refills: 0 | Status: DISCONTINUED | OUTPATIENT
Start: 2020-12-01 | End: 2020-12-03

## 2020-12-01 RX ORDER — GLUCAGON INJECTION, SOLUTION 0.5 MG/.1ML
1 INJECTION, SOLUTION SUBCUTANEOUS ONCE
Refills: 0 | Status: DISCONTINUED | OUTPATIENT
Start: 2020-12-01 | End: 2020-12-05

## 2020-12-01 RX ORDER — PANTOPRAZOLE SODIUM 20 MG/1
40 TABLET, DELAYED RELEASE ORAL DAILY
Refills: 0 | Status: DISCONTINUED | OUTPATIENT
Start: 2020-12-01 | End: 2020-12-03

## 2020-12-01 RX ORDER — DEXTROSE MONOHYDRATE, SODIUM CHLORIDE, AND POTASSIUM CHLORIDE 50; .745; 4.5 G/1000ML; G/1000ML; G/1000ML
1000 INJECTION, SOLUTION INTRAVENOUS
Refills: 0 | Status: DISCONTINUED | OUTPATIENT
Start: 2020-12-01 | End: 2020-12-03

## 2020-12-01 RX ORDER — SODIUM CHLORIDE 9 MG/ML
1000 INJECTION, SOLUTION INTRAVENOUS
Refills: 0 | Status: DISCONTINUED | OUTPATIENT
Start: 2020-12-01 | End: 2020-12-05

## 2020-12-01 RX ORDER — DEXTROSE 50 % IN WATER 50 %
12.5 SYRINGE (ML) INTRAVENOUS ONCE
Refills: 0 | Status: DISCONTINUED | OUTPATIENT
Start: 2020-12-01 | End: 2020-12-05

## 2020-12-01 RX ORDER — DEXTROSE 50 % IN WATER 50 %
25 SYRINGE (ML) INTRAVENOUS ONCE
Refills: 0 | Status: DISCONTINUED | OUTPATIENT
Start: 2020-12-01 | End: 2020-12-05

## 2020-12-01 RX ORDER — SODIUM CHLORIDE 9 MG/ML
1000 INJECTION INTRAMUSCULAR; INTRAVENOUS; SUBCUTANEOUS ONCE
Refills: 0 | Status: COMPLETED | OUTPATIENT
Start: 2020-12-01 | End: 2020-12-01

## 2020-12-01 RX ORDER — INSULIN LISPRO 100/ML
VIAL (ML) SUBCUTANEOUS EVERY 6 HOURS
Refills: 0 | Status: DISCONTINUED | OUTPATIENT
Start: 2020-12-01 | End: 2020-12-02

## 2020-12-01 RX ORDER — PIPERACILLIN AND TAZOBACTAM 4; .5 G/20ML; G/20ML
3.38 INJECTION, POWDER, LYOPHILIZED, FOR SOLUTION INTRAVENOUS EVERY 8 HOURS
Refills: 0 | Status: DISCONTINUED | OUTPATIENT
Start: 2020-12-01 | End: 2020-12-05

## 2020-12-01 RX ORDER — ENOXAPARIN SODIUM 100 MG/ML
40 INJECTION SUBCUTANEOUS DAILY
Refills: 0 | Status: DISCONTINUED | OUTPATIENT
Start: 2020-12-01 | End: 2020-12-02

## 2020-12-01 RX ORDER — ACETAMINOPHEN 500 MG
1000 TABLET ORAL EVERY 6 HOURS
Refills: 0 | Status: DISCONTINUED | OUTPATIENT
Start: 2020-12-01 | End: 2020-12-05

## 2020-12-01 RX ORDER — DEXTROSE 50 % IN WATER 50 %
15 SYRINGE (ML) INTRAVENOUS ONCE
Refills: 0 | Status: DISCONTINUED | OUTPATIENT
Start: 2020-12-01 | End: 2020-12-05

## 2020-12-01 RX ORDER — ACETAMINOPHEN 500 MG
1000 TABLET ORAL EVERY 6 HOURS
Refills: 0 | Status: DISCONTINUED | OUTPATIENT
Start: 2020-12-01 | End: 2020-12-01

## 2020-12-01 RX ORDER — SODIUM CHLORIDE 9 MG/ML
1000 INJECTION INTRAMUSCULAR; INTRAVENOUS; SUBCUTANEOUS
Refills: 0 | Status: DISCONTINUED | OUTPATIENT
Start: 2020-12-01 | End: 2020-12-01

## 2020-12-01 RX ORDER — POTASSIUM PHOSPHATE, MONOBASIC POTASSIUM PHOSPHATE, DIBASIC 236; 224 MG/ML; MG/ML
30 INJECTION, SOLUTION INTRAVENOUS ONCE
Refills: 0 | Status: COMPLETED | OUTPATIENT
Start: 2020-12-01 | End: 2020-12-01

## 2020-12-01 RX ADMIN — PIPERACILLIN AND TAZOBACTAM 25 GRAM(S): 4; .5 INJECTION, POWDER, LYOPHILIZED, FOR SOLUTION INTRAVENOUS at 21:02

## 2020-12-01 RX ADMIN — POTASSIUM PHOSPHATE, MONOBASIC POTASSIUM PHOSPHATE, DIBASIC 83.33 MILLIMOLE(S): 236; 224 INJECTION, SOLUTION INTRAVENOUS at 13:57

## 2020-12-01 RX ADMIN — PIPERACILLIN AND TAZOBACTAM 25 GRAM(S): 4; .5 INJECTION, POWDER, LYOPHILIZED, FOR SOLUTION INTRAVENOUS at 05:18

## 2020-12-01 RX ADMIN — PANTOPRAZOLE SODIUM 40 MILLIGRAM(S): 20 TABLET, DELAYED RELEASE ORAL at 12:28

## 2020-12-01 RX ADMIN — ENOXAPARIN SODIUM 40 MILLIGRAM(S): 100 INJECTION SUBCUTANEOUS at 12:27

## 2020-12-01 RX ADMIN — SODIUM CHLORIDE 2000 MILLILITER(S): 9 INJECTION INTRAMUSCULAR; INTRAVENOUS; SUBCUTANEOUS at 02:52

## 2020-12-01 RX ADMIN — PIPERACILLIN AND TAZOBACTAM 25 GRAM(S): 4; .5 INJECTION, POWDER, LYOPHILIZED, FOR SOLUTION INTRAVENOUS at 13:56

## 2020-12-01 NOTE — H&P ADULT - NSHPPHYSICALEXAM_GEN_ALL_CORE
Vitals:   T(C): 37.8 (11-30-20 @ 23:28), Max: 39.4 (11-30-20 @ 17:47)  HR: 88 (11-30-20 @ 23:28) (88 - 105)  BP: 118/67 (11-30-20 @ 23:28) (118/67 - 165/80)  RR: 20 (11-30-20 @ 23:28) (20 - 20)  SpO2: 99% (11-30-20 @ 23:28) (97% - 99%)    Height (cm): 180.3 (11-30 @ 17:47)  Weight (kg): 88.5 (11-30 @ 17:47)  BMI (kg/m2): 27.2 (11-30 @ 17:47)  BSA (m2): 2.09 (11-30 @ 17:47)    General: Well appearing male, NAD  HEENT: Scleral icterus bilaterally  Cardiac: RRR  Lungs: No increased WOB  Abdomen: Soft, mildly distended, non-tender, (-) Vogt's sign, (-) McBurney's point tenderness, (-) Rovsing sign, (-) Obturator sign, (-) Psoas sign  Extremities: No edema  Skin: Warm Vitals:   T(C): 37.8 (11-30-20 @ 23:28), Max: 39.4 (11-30-20 @ 17:47)  HR: 88 (11-30-20 @ 23:28) (88 - 105)  BP: 118/67 (11-30-20 @ 23:28) (118/67 - 165/80)  RR: 20 (11-30-20 @ 23:28) (20 - 20)  SpO2: 99% (11-30-20 @ 23:28) (97% - 99%)    Height (cm): 180.3 (11-30 @ 17:47)  Weight (kg): 88.5 (11-30 @ 17:47)  BMI (kg/m2): 27.2 (11-30 @ 17:47)  BSA (m2): 2.09 (11-30 @ 17:47)    Examination  General: Well appearing male, NAD  HEENT: Scleral icterus bilaterally  Cardiac: RRR  Lungs: No increased WOB  Abdomen: Soft, mildly distended, non-tender, (-) Vogt's sign, (-) McBurney's point tenderness, (-) Rovsing sign  Extremities: No edema

## 2020-12-01 NOTE — CONSULT NOTE ADULT - SUBJECTIVE AND OBJECTIVE BOX
Chief Complaint:  Patient is a 60y old  Male who presents with a chief complaint of     HPI: Pt is a 61 yo M with PMH of DM2, HLD presented with nause and vomiting. Symptoms have been persistent for over a week but then worsened in last 1-2 days. He has several episodes of vomiting over the weekend. He states he has epigastric and RUQ pain. Denies radiation to the back. Denies fever at home. No changes in BM, chest pain, shortness of breath, confusion. He has been unable to tolerate po. His PCP suggested ED visit given symptoms.       Allergies:  No Known Allergies      Home Medications:    Hospital Medications:  acetaminophen   Tablet .. 1000 milliGRAM(s) Oral every 6 hours PRN  dextrose 40% Gel 15 Gram(s) Oral once  dextrose 5%. 1000 milliLiter(s) IV Continuous <Continuous>  dextrose 5%. 1000 milliLiter(s) IV Continuous <Continuous>  dextrose 50% Injectable 25 Gram(s) IV Push once  dextrose 50% Injectable 12.5 Gram(s) IV Push once  dextrose 50% Injectable 25 Gram(s) IV Push once  enoxaparin Injectable 40 milliGRAM(s) SubCutaneous daily  glucagon  Injectable 1 milliGRAM(s) IntraMuscular once  HYDROmorphone  Injectable 0.5 milliGRAM(s) IV Push every 6 hours PRN  insulin lispro (ADMELOG) corrective regimen sliding scale   SubCutaneous every 6 hours  piperacillin/tazobactam IVPB.. 3.375 Gram(s) IV Intermittent every 8 hours  potassium phosphate IVPB 30 milliMole(s) IV Intermittent once  sodium chloride 0.9%. 1000 milliLiter(s) IV Continuous <Continuous>      PMHX/PSHX:  Hypercholesterolemia    Diabetes mellitus    No significant past surgical history        Family history:  Family history of diabetes mellitus    Family history of myocardial infarction        Social History:     ROS: As per HPI, 14-point ROS negative otherwise.    General:  No wt loss, fevers, chills, night sweats, fatigue,   Eyes:  Good vision, no reported pain  ENT:  No sore throat, pain, runny nose, dysphagia  CV:  No pain, palpitations, hypo/hypertension  Resp:  No dyspnea, cough, tachypnea, wheezing  GI:  See HPI  :  No pain, bleeding, incontinence, nocturia  Muscle:  No pain, weakness  Neuro:  No weakness, tingling, memory problems  Psych:  No fatigue, insomnia, mood problems, depression  Endocrine:  No polyuria, polydipsia, cold/heat intolerance  Heme:  No petechiae, ecchymosis, easy bruisability  Skin:  No rash, edema      PHYSICAL EXAM:     Vital Signs:  Vital Signs Last 24 Hrs  T(C): 37.8 (01 Dec 2020 03:41), Max: 39.4 (2020 17:47)  T(F): 100 (01 Dec 2020 03:41), Max: 103 (2020 20:47)  HR: 80 (01 Dec 2020 03:41) (80 - 105)  BP: 127/72 (01 Dec 2020 03:41) (118/67 - 165/80)  BP(mean): --  RR: 20 (01 Dec 2020 03:41) (20 - 20)  SpO2: 97% (01 Dec 2020 03:) (97% - 100%)  Daily Height in cm: 180.34 (2020 17:47)    Daily     GENERAL:  Appears stated age, well-groomed, well-nourished, no distress  HEENT:  NC/AT,  conjunctivae clear and pink  CHEST:  Full & symmetric excursion, no increased effort  HEART:  Regular rhythm, no JVD  ABDOMEN:  Soft, TTP in epigastrum/RUQ, non-distended, normoactive bowel sounds,  no masses , no hepatosplenomegaly  EXTREMITIES:  no cyanosis, clubbing or edema  SKIN:  No rash/erythema/ecchymoses/petechiae/wounds/abscess/warm/dry  NEURO:  Alert, oriented, nonfocal    LABS:                        12.0   5.28  )-----------( 180      ( 01 Dec 2020 06:59 )             35.2         134<L>  |  102  |  7   ----------------------------<  143<H>  3.6   |  22  |  0.78    Ca    8.5      01 Dec 2020 06:59  Phos  2.6       Mg     2.2         TPro  5.5<L>  /  Alb  3.1<L>  /  TBili  3.2<H>  /  DBili  2.2<H>  /  AST  77<H>  /  ALT  196<H>  /  AlkPhos  291<H>      LIVER FUNCTIONS - ( 01 Dec 2020 06:59 )  Alb: 3.1 g/dL / Pro: 5.5 g/dL / ALK PHOS: 291 U/L / ALT: 196 U/L / AST: 77 U/L / GGT: x             Urinalysis Basic - ( 2020 21:10 )    Color: Yellow / Appearance: Clear / S.012 / pH: x  Gluc: x / Ketone: Small  / Bili: Small / Urobili: Negative   Blood: x / Protein: Trace / Nitrite: Negative   Leuk Esterase: Negative / RBC: 1 /hpf / WBC 0 /HPF   Sq Epi: x / Non Sq Epi: 0 /hpf / Bacteria: Negative      Amylase Serum--      Lipase serum41       Ammonia--      Imaging:  `< from: CT Abdomen and Pelvis w/ IV Cont (20 @ 21:48) >  FINDINGS:  LOWER CHEST: Left lower lobe calcified granuloma. Right basilar dependent atelectasis.    LIVER: Within normal limits.  BILE DUCTS: Normal caliber.  GALLBLADDER: Cholelithiasis (3-49). Thickened and edematous gallbladder wall with pericholecystic edema.  SPLEEN: Within normal limits.  PANCREAS: Within normal limits.  ADRENALS: Within normal limits.  KIDNEYS/URETERS: Nonspecific bilateral perinephric fat stranding. Homogeneously enhancing parenchyma. No hydronephrosis or nephrolithiasis.    BLADDER: Minimally distended.  REPRODUCTIVE ORGANS: Prostate is enlarged.    BOWEL: No bowel obstruction. Appendix is normal. Gastric fundus wall thickening in a setting of adequately distended stomach.  PERITONEUM: No ascites.  VESSELS: Within normal limits.  RETROPERITONEUM/LYMPH NODES: No lymphadenopathy.  ABDOMINAL WALL: Within normal limits.  BONES: Degenerative changes. Straightened lumbar lordosis.    IMPRESSION:    Cholelithiasis, acute cholecystitis. No biliary dilatation or visualized radiodense stone in the CBD.    Gastric fundus wall thickening in a setting of adequately distended stomach. Recommended clinical correlation to assess for gastritis or peptic ulcer disease. Consider nonemergent EGD to exclude underlying malignancy.      < end of copied text >           Chief Complaint:  Patient is a 60y old  Male who presents with a chief complaint of     HPI: Pt is a 59 yo M with PMH of DM2, HLD presented with nause and vomiting. Symptoms have been persistent for over a week but then worsened in last 1-2 days. He has several episodes of vomiting over the weekend. He states he has epigastric and RUQ pain. Denies radiation to the back. Denies fever at home. No changes in BM, chest pain, shortness of breath, confusion. He has been unable to tolerate po. His PCP suggested ED visit given symptoms.       Allergies:  No Known Allergies      Home Medications:    Hospital Medications:  acetaminophen   Tablet .. 1000 milliGRAM(s) Oral every 6 hours PRN  dextrose 40% Gel 15 Gram(s) Oral once  dextrose 5%. 1000 milliLiter(s) IV Continuous <Continuous>  dextrose 5%. 1000 milliLiter(s) IV Continuous <Continuous>  dextrose 50% Injectable 25 Gram(s) IV Push once  dextrose 50% Injectable 12.5 Gram(s) IV Push once  dextrose 50% Injectable 25 Gram(s) IV Push once  enoxaparin Injectable 40 milliGRAM(s) SubCutaneous daily  glucagon  Injectable 1 milliGRAM(s) IntraMuscular once  HYDROmorphone  Injectable 0.5 milliGRAM(s) IV Push every 6 hours PRN  insulin lispro (ADMELOG) corrective regimen sliding scale   SubCutaneous every 6 hours  piperacillin/tazobactam IVPB.. 3.375 Gram(s) IV Intermittent every 8 hours  potassium phosphate IVPB 30 milliMole(s) IV Intermittent once  sodium chloride 0.9%. 1000 milliLiter(s) IV Continuous <Continuous>      PMHX/PSHX:  Hypercholesterolemia    Diabetes mellitus    No significant past surgical history        Family history:  Family history of diabetes mellitus    Family history of myocardial infarction        Social History:     ROS: As per HPI, 14-point ROS negative otherwise.    General:  No wt loss, fevers, chills, night sweats, fatigue,   Eyes:  Good vision, no reported pain  ENT:  No sore throat, pain, runny nose, dysphagia  CV:  No pain, palpitations, hypo/hypertension  Resp:  No dyspnea, cough, tachypnea, wheezing  GI:  See HPI  :  No pain, bleeding, incontinence, nocturia  Muscle:  No pain, weakness  Neuro:  No weakness, tingling, memory problems  Psych:  No fatigue, insomnia, mood problems, depression  Endocrine:  No polyuria, polydipsia, cold/heat intolerance  Heme:  No petechiae, ecchymosis, easy bruisability  Skin:  No rash, edema      PHYSICAL EXAM:     Vital Signs:  Vital Signs Last 24 Hrs  T(C): 37.8 (01 Dec 2020 03:41), Max: 39.4 (2020 17:47)  T(F): 100 (01 Dec 2020 03:41), Max: 103 (2020 20:47)  HR: 80 (01 Dec 2020 03:41) (80 - 105)  BP: 127/72 (01 Dec 2020 03:41) (118/67 - 165/80)  BP(mean): --  RR: 20 (01 Dec 2020 03:41) (20 - 20)  SpO2: 97% (01 Dec 2020 03:) (97% - 100%)  Daily Height in cm: 180.34 (2020 17:47)    Daily     GENERAL:  Appears stated age, well-groomed, well-nourished, no distress  HEENT:  NC/AT,  conjunctivae clear and pink  CHEST:  Full & symmetric excursion, no increased effort  HEART:  Regular rhythm, no JVD  ABDOMEN:  Soft, Non tender, non-distended, normoactive bowel sounds,  no masses , no hepatosplenomegaly  EXTREMITIES:  no cyanosis, clubbing or edema  SKIN:  No rash/erythema/ecchymoses/petechiae/wounds/abscess/warm/dry  NEURO:  Alert, oriented, nonfocal    LABS:                        12.0   5.28  )-----------( 180      ( 01 Dec 2020 06:59 )             35.2         134<L>  |  102  |  7   ----------------------------<  143<H>  3.6   |  22  |  0.78    Ca    8.5      01 Dec 2020 06:59  Phos  2.6       Mg     2.2         TPro  5.5<L>  /  Alb  3.1<L>  /  TBili  3.2<H>  /  DBili  2.2<H>  /  AST  77<H>  /  ALT  196<H>  /  AlkPhos  291<H>      LIVER FUNCTIONS - ( 01 Dec 2020 06:59 )  Alb: 3.1 g/dL / Pro: 5.5 g/dL / ALK PHOS: 291 U/L / ALT: 196 U/L / AST: 77 U/L / GGT: x             Urinalysis Basic - ( 2020 21:10 )    Color: Yellow / Appearance: Clear / S.012 / pH: x  Gluc: x / Ketone: Small  / Bili: Small / Urobili: Negative   Blood: x / Protein: Trace / Nitrite: Negative   Leuk Esterase: Negative / RBC: 1 /hpf / WBC 0 /HPF   Sq Epi: x / Non Sq Epi: 0 /hpf / Bacteria: Negative      Amylase Serum--      Lipase serum41       Ammonia--      Imaging:  `< from: CT Abdomen and Pelvis w/ IV Cont (20 @ 21:48) >  FINDINGS:  LOWER CHEST: Left lower lobe calcified granuloma. Right basilar dependent atelectasis.    LIVER: Within normal limits.  BILE DUCTS: Normal caliber.  GALLBLADDER: Cholelithiasis (3-49). Thickened and edematous gallbladder wall with pericholecystic edema.  SPLEEN: Within normal limits.  PANCREAS: Within normal limits.  ADRENALS: Within normal limits.  KIDNEYS/URETERS: Nonspecific bilateral perinephric fat stranding. Homogeneously enhancing parenchyma. No hydronephrosis or nephrolithiasis.    BLADDER: Minimally distended.  REPRODUCTIVE ORGANS: Prostate is enlarged.    BOWEL: No bowel obstruction. Appendix is normal. Gastric fundus wall thickening in a setting of adequately distended stomach.  PERITONEUM: No ascites.  VESSELS: Within normal limits.  RETROPERITONEUM/LYMPH NODES: No lymphadenopathy.  ABDOMINAL WALL: Within normal limits.  BONES: Degenerative changes. Straightened lumbar lordosis.    IMPRESSION:    Cholelithiasis, acute cholecystitis. No biliary dilatation or visualized radiodense stone in the CBD.    Gastric fundus wall thickening in a setting of adequately distended stomach. Recommended clinical correlation to assess for gastritis or peptic ulcer disease. Consider nonemergent EGD to exclude underlying malignancy.      < end of copied text >

## 2020-12-01 NOTE — H&P ADULT - NSICDXFAMILYHX_GEN_ALL_CORE_FT
FAMILY HISTORY:  Family history of diabetes mellitus, mother  Family history of myocardial infarction, father

## 2020-12-01 NOTE — H&P ADULT - NSHPSOCIALHISTORY_GEN_ALL_CORE
[facility]  [address]  [facility] [address]  Phone: [phone]  Fax: [phone] October 8, 2020      Damir Rendon Jr., MD  [address]  [facility] [address]    Patient: Chris Christopher   MR Number: [id number]   YOB: 1973   Date of Visit: 9/30/2020       Dear Dr. Damir Rendon Jr.:    Thank you for referring Chris Christopher to me for evaluation. Below you will find relevant portions of my assessment and plan of care.    Mr. Christopher is a 46-year-old male with no major past medical history presenting for evaluation of chronic non-healing left leg wound status post fall in fresh water. He has significant edema and works on his feet, which is likely contributing to his wound. Cultures have never been obtained. I counseled patient on managing his leg swelling and continuing local wound care, as flap coverage is not possible in setting of such edema. We will also obtain cultures and refer to Infectious disease.      Plan:  - return to clinic in 3 weeks  - cultures taken of both wounds, will follow-up with results  - continue wet to dry dressings  - manage LE edema - elevation, compression, low salt diet    If you have questions, please do not hesitate to call me. I look forward to following Chris Christopher along with you.    Sincerely,    Edis López MD  Section of Plastic Surgery  Department of Surgery  [facility]     CRB/hcr    CC:  Laith White MD        Patient denies smoking tobacco or marijuana, alcohol use or illicit drug use.  at grocery store.

## 2020-12-01 NOTE — ED ADULT NURSE REASSESSMENT NOTE - NS ED NURSE REASSESS COMMENT FT1
Pt has mild chills & reports feeling abdominal pain, temperature 99.6 orally & VS as documented. Michelle PICKENS (surgery) made aware, recommend administering 1g Tylenol & NS bolus.

## 2020-12-01 NOTE — H&P ADULT - NSHPLABSRESULTS_GEN_ALL_CORE
----------  LABORATORY DATA:  ----------                        13.4   7.59  )-----------( 212      ( 2020 20:42 )             39.3                   126<L>  |  91<L>  |  9   ----------------------------<  191<H>  3.7   |  23  |  0.85    Ca    8.6      2020 20:42    TPro  6.9  /  Alb  3.8  /  TBili  3.9<H>  /  DBili  x   /  AST  105<H>  /  ALT  261<H>  /  AlkPhos  369<H>      LIVER FUNCTIONS - ( 2020 20:42 )  Alb: 3.8 g/dL / Pro: 6.9 g/dL / ALK PHOS: 369 U/L / ALT: 261 U/L / AST: 105 U/L / GGT: x             Urinalysis Basic - ( 2020 21:10 )    Color: Yellow / Appearance: Clear / S.012 / pH: x  Gluc: x / Ketone: Small  / Bili: Small / Urobili: Negative   Blood: x / Protein: Trace / Nitrite: Negative   Leuk Esterase: Negative / RBC: 1 /hpf / WBC 0 /HPF   Sq Epi: x / Non Sq Epi: 0 /hpf / Bacteria: Negative    ----------  RADIOGRAPHIC DATA:  ---------  PACS Image: Image(s) Available (20 @ 21:48)  PACS Image: Image(s) Available (20 @ 20:46)    CT A/P w/ IV contrast (): Cholelithiasis, acute cholecystitis. No biliary dilatation or visualized radiodense stone in the CBD. Gastric fundus wall thickening in a setting of adequately distended stomach. Recommended clinical correlation to assess for gastritis or peptic ulcer disease. Consider nonemergent EGD to exclude underlying malignancy.    Chest Xray (): Clear lungs. ----------  LABORATORY DATA:  ----------                        13.4   7.59  )-----------( 212      ( 2020 20:42 )             39.3                   126<L>  |  91<L>  |  9   ----------------------------<  191<H>  3.7   |  23  |  0.85    Ca    8.6      2020 20:42    TPro  6.9  /  Alb  3.8  /  TBili  3.9<H>  /  DBili  x   /  AST  105<H>  /  ALT  261<H>  /  AlkPhos  369<H>      LIVER FUNCTIONS - ( 2020 20:42 )  Alb: 3.8 g/dL / Pro: 6.9 g/dL / ALK PHOS: 369 U/L / ALT: 261 U/L / AST: 105 U/L / GGT: x             Urinalysis Basic - ( 2020 21:10 )    Color: Yellow / Appearance: Clear / S.012 / pH: x  Gluc: x / Ketone: Small  / Bili: Small / Urobili: Negative   Blood: x / Protein: Trace / Nitrite: Negative   Leuk Esterase: Negative / RBC: 1 /hpf / WBC 0 /HPF   Sq Epi: x / Non Sq Epi: 0 /hpf / Bacteria: Negative    ----------  RADIOGRAPHIC DATA:  ---------    CT A/P w/ IV contrast (): Cholelithiasis, acute cholecystitis. No biliary dilatation or visualized radiodense stone in the CBD. Gastric fundus wall thickening in a setting of adequately distended stomach. Recommended clinical correlation to assess for gastritis or peptic ulcer disease. Consider nonemergent EGD to exclude underlying malignancy.    Chest Xray (): Clear lungs.

## 2020-12-01 NOTE — H&P ADULT - HISTORY OF PRESENT ILLNESS
60 y.o M with PMHx of DM2, HLD presented with intermittent nausea and vomiting for 10 days, that got progressively worse Saturday after eating tuna fish. Patient endorses multiple episodes of vomiting Saturday night, associated with crampy abdominal pain. He locates the abdominal pain to the RLQ/LLQ and denies radiation. Denies hematemesis, diarrhea, constipation, chills, fever this past week, but he was febrile in the ED. Patient denies abdominal pain since Saturday. Last BM this morning. Patient visited PCP today regarding recent N/V, and was then sent to the ED with concerns for appendicitis.          60 y.o M with PMHx of DM2, HLD presented with intermittent nausea and vomiting for 10 days, that got progressively worse Saturday after eating tuna fish. Patient endorses multiple episodes of vomiting Saturday night, associated with crampy abdominal pain. He locates the abdominal pain to the RLQ/LLQ and denies radiation. He endorses on and off nausea for the past 10 days that has persisted and is causing him inability to tolerate much PO.  Denies hematemesis, diarrhea, constipation, chills, fever this past week, but he was febrile in the ED. Patient denies abdominal pain since Saturday. Last BM this morning. Patient visited PCP today regarding recent N/V, and was then sent to the ED with concerns for appendicitis.     In the ED he was febrile to 103, mildly tachycardic to 105, and HTN to 170/80. His labs reflect normal WBC count and normal lactate but a hyperbilirubinemia (3.9) and transaminitis (185/261) with ALk phos 369. CT then demonstrated cholelithiasis and cholecystitis without CBD dilation.

## 2020-12-01 NOTE — H&P ADULT - ATTENDING COMMENTS
I saw and examined the pt and discussed the tx plan with the House Staff. I agree with the exam and plan as documented in the above H&P.  Pt with several days of symptoms, picture c/w acute shukri and elevated LFTs. Advanced GI consult. Continue ABX.  Yesica Conte MD I saw and examined the pt and discussed the tx plan with the House Staff. I agree with the exam and plan as documented in the above H&P.  Pt with several days of symptoms, reports lower abdominal pain and nausea. On exam he is comfortable and has no RUQ/epigastric tenderness. CT c/w acute shukri. + elevated LFTs.   Advanced GI consult. Continue ABX.  Yesica Conte MD

## 2020-12-01 NOTE — CONSULT NOTE ADULT - ASSESSMENT
Impression:  # Elevated liver enzymes: CT with cholecystitis but no evidence of CBD dilation or choledocholithiasis Likely passed stone/sludge. Tbili and liver enzymes downtrending. Non septic now but initially with sepsis given fever and tachycardia.  # Gastric thickening: noted on CT. Non specific findings. Possible PUD or gastritis in setting of n/v. Can also explain symptoms of n/v.   # Acute cholecytitis  # T2DM  # Hypertension     Recommendation:  - please check MRI/MRCP w/o contrast to asses biliary tree  - monitor fever curve, CBC, CMP  - antibiotics per primary team  - will likely need CCY, timing pending clinical course  - ppi daily   - supportive care    Jere Odonnell  [phone]  Please call/page on call fellow on weekends and weekdays after 5pm

## 2020-12-02 ENCOUNTER — TRANSCRIPTION ENCOUNTER (OUTPATIENT)
Age: 60
End: 2020-12-02

## 2020-12-02 LAB
A1C WITH ESTIMATED AVERAGE GLUCOSE RESULT: 7.1 % — HIGH (ref 4–5.6)
ALBUMIN SERPL ELPH-MCNC: 3.2 G/DL — LOW (ref 3.3–5)
ALP SERPL-CCNC: 286 U/L — HIGH (ref 40–120)
ALT FLD-CCNC: 148 U/L — HIGH (ref 10–45)
ANION GAP SERPL CALC-SCNC: 11 MMOL/L — SIGNIFICANT CHANGE UP (ref 5–17)
APTT BLD: 31.5 SEC — SIGNIFICANT CHANGE UP (ref 27.5–35.5)
AST SERPL-CCNC: 63 U/L — HIGH (ref 10–40)
BILIRUB DIRECT SERPL-MCNC: 1.7 MG/DL — HIGH (ref 0–0.2)
BILIRUB INDIRECT FLD-MCNC: 0.7 MG/DL — SIGNIFICANT CHANGE UP (ref 0.2–1)
BILIRUB SERPL-MCNC: 2.4 MG/DL — HIGH (ref 0.2–1.2)
BLD GP AB SCN SERPL QL: NEGATIVE — SIGNIFICANT CHANGE UP
BUN SERPL-MCNC: 6 MG/DL — LOW (ref 7–23)
CALCIUM SERPL-MCNC: 8.6 MG/DL — SIGNIFICANT CHANGE UP (ref 8.4–10.5)
CHLORIDE SERPL-SCNC: 102 MMOL/L — SIGNIFICANT CHANGE UP (ref 96–108)
CO2 SERPL-SCNC: 22 MMOL/L — SIGNIFICANT CHANGE UP (ref 22–31)
CREAT SERPL-MCNC: 0.83 MG/DL — SIGNIFICANT CHANGE UP (ref 0.5–1.3)
ESTIMATED AVERAGE GLUCOSE: 157 MG/DL — HIGH (ref 68–114)
GLUCOSE BLDC GLUCOMTR-MCNC: 133 MG/DL — HIGH (ref 70–99)
GLUCOSE BLDC GLUCOMTR-MCNC: 142 MG/DL — HIGH (ref 70–99)
GLUCOSE BLDC GLUCOMTR-MCNC: 143 MG/DL — HIGH (ref 70–99)
GLUCOSE BLDC GLUCOMTR-MCNC: 171 MG/DL — HIGH (ref 70–99)
GLUCOSE SERPL-MCNC: 152 MG/DL — HIGH (ref 70–99)
GRAM STN FLD: SIGNIFICANT CHANGE UP
HCT VFR BLD CALC: 37.5 % — LOW (ref 39–50)
HGB BLD-MCNC: 12.7 G/DL — LOW (ref 13–17)
INR BLD: 1.14 RATIO — SIGNIFICANT CHANGE UP (ref 0.88–1.16)
MAGNESIUM SERPL-MCNC: 2.1 MG/DL — SIGNIFICANT CHANGE UP (ref 1.6–2.6)
MCHC RBC-ENTMCNC: 28.7 PG — SIGNIFICANT CHANGE UP (ref 27–34)
MCHC RBC-ENTMCNC: 33.9 GM/DL — SIGNIFICANT CHANGE UP (ref 32–36)
MCV RBC AUTO: 84.8 FL — SIGNIFICANT CHANGE UP (ref 80–100)
NRBC # BLD: 0 /100 WBCS — SIGNIFICANT CHANGE UP (ref 0–0)
PHOSPHATE SERPL-MCNC: 2.8 MG/DL — SIGNIFICANT CHANGE UP (ref 2.5–4.5)
PLATELET # BLD AUTO: 185 K/UL — SIGNIFICANT CHANGE UP (ref 150–400)
POTASSIUM SERPL-MCNC: 3.9 MMOL/L — SIGNIFICANT CHANGE UP (ref 3.5–5.3)
POTASSIUM SERPL-SCNC: 3.9 MMOL/L — SIGNIFICANT CHANGE UP (ref 3.5–5.3)
PROT SERPL-MCNC: 6 G/DL — SIGNIFICANT CHANGE UP (ref 6–8.3)
PROTHROM AB SERPL-ACNC: 13.4 SEC — SIGNIFICANT CHANGE UP (ref 10.6–13.6)
RBC # BLD: 4.42 M/UL — SIGNIFICANT CHANGE UP (ref 4.2–5.8)
RBC # FLD: 13.4 % — SIGNIFICANT CHANGE UP (ref 10.3–14.5)
RH IG SCN BLD-IMP: POSITIVE — SIGNIFICANT CHANGE UP
SODIUM SERPL-SCNC: 135 MMOL/L — SIGNIFICANT CHANGE UP (ref 135–145)
SPECIMEN SOURCE: SIGNIFICANT CHANGE UP
WBC # BLD: 3.53 K/UL — LOW (ref 3.8–10.5)
WBC # FLD AUTO: 3.53 K/UL — LOW (ref 3.8–10.5)

## 2020-12-02 PROCEDURE — 47490 INCISION OF GALLBLADDER: CPT

## 2020-12-02 RX ORDER — INSULIN LISPRO 100/ML
VIAL (ML) SUBCUTANEOUS AT BEDTIME
Refills: 0 | Status: DISCONTINUED | OUTPATIENT
Start: 2020-12-02 | End: 2020-12-05

## 2020-12-02 RX ORDER — ENOXAPARIN SODIUM 100 MG/ML
40 INJECTION SUBCUTANEOUS DAILY
Refills: 0 | Status: DISCONTINUED | OUTPATIENT
Start: 2020-12-02 | End: 2020-12-05

## 2020-12-02 RX ORDER — INSULIN LISPRO 100/ML
VIAL (ML) SUBCUTANEOUS
Refills: 0 | Status: DISCONTINUED | OUTPATIENT
Start: 2020-12-02 | End: 2020-12-05

## 2020-12-02 RX ADMIN — PIPERACILLIN AND TAZOBACTAM 25 GRAM(S): 4; .5 INJECTION, POWDER, LYOPHILIZED, FOR SOLUTION INTRAVENOUS at 22:13

## 2020-12-02 RX ADMIN — PANTOPRAZOLE SODIUM 40 MILLIGRAM(S): 20 TABLET, DELAYED RELEASE ORAL at 13:41

## 2020-12-02 RX ADMIN — PIPERACILLIN AND TAZOBACTAM 25 GRAM(S): 4; .5 INJECTION, POWDER, LYOPHILIZED, FOR SOLUTION INTRAVENOUS at 13:41

## 2020-12-02 RX ADMIN — Medication 1: at 13:41

## 2020-12-02 RX ADMIN — ENOXAPARIN SODIUM 40 MILLIGRAM(S): 100 INJECTION SUBCUTANEOUS at 13:41

## 2020-12-02 RX ADMIN — DEXTROSE MONOHYDRATE, SODIUM CHLORIDE, AND POTASSIUM CHLORIDE 100 MILLILITER(S): 50; .745; 4.5 INJECTION, SOLUTION INTRAVENOUS at 06:18

## 2020-12-02 RX ADMIN — PIPERACILLIN AND TAZOBACTAM 25 GRAM(S): 4; .5 INJECTION, POWDER, LYOPHILIZED, FOR SOLUTION INTRAVENOUS at 06:13

## 2020-12-02 NOTE — CONSULT NOTE ADULT - SUBJECTIVE AND OBJECTIVE BOX
Vascular & Interventional Radiology Brief Consult Note    Evaluate for Procedure: percutaneous cholecystostomy    HPI:   60 y.o M with PMHx of DM2, HLD with intermittent N/V associated with bilateral lower quadrant pain, fever, elevated transaminases, total bili 3.9 and cholelithiasis, acute cholecystitis on CT A/P. MRCP 12/1 negative for choledocolithiasis.    IR consulted for percutaneous cholecystostomy.     Allergies:   Medications (Abx/Cardiac/Anticoagulation/Blood Products)  enoxaparin Injectable: 40 milliGRAM(s) SubCutaneous (12-01 @ 12:27)  piperacillin/tazobactam IVPB..: 25 mL/Hr IV Intermittent (12-02 @ 06:13)  piperacillin/tazobactam IVPB...: 200 mL/Hr IV Intermittent (11-30 @ 23:34)    Data:    T(C): 36.8  HR: 61  BP: 118/69  RR: 18  SpO2: 98%    -WBC 3.53 / HgB 12.7 / Hct 37.5 / Plt 185  -Na 135 / Cl 102 / BUN 6 / Glucose 152  -K 3.9 / CO2 22 / Cr 0.83  - / Alk Phos 286 / T.Bili 2.4    Imaging:     CT and MR abdomen reviewed    Assessment/Plan:   60 y.o M with PMHx of DM2, HLD with intermittent N/V associated with bilateral lower quadrant pain, fever, elevated transaminases, total bili 3.9 and cholelithiasis, acute cholecystitis on CT A/P. MRCP 12/1 negative for choledocolithiasis.    -case reviewed with Dr. Cuba  -keep patient NPO  -plan for percutaneous cholecystostomy with IR today as long as pending coags are within acceptable range

## 2020-12-02 NOTE — PROGRESS NOTE ADULT - SUBJECTIVE AND OBJECTIVE BOX
Surgery Progress Note    SUBJECTIVE: Pt seen and examined at bedside. Patient comfortable and in no-apparent distress. MRCP yesterday demonstrating no choledocolithiasis, persistent gastric wall thickening. No nausea or vomiting, pain is controlled.    Vital Signs Last 24 Hrs  T(C): 37.6 (02 Dec 2020 01:37), Max: 38.2 (01 Dec 2020 12:42)  T(F): 99.6 (02 Dec 2020 01:37), Max: 100.7 (01 Dec 2020 12:42)  HR: 82 (02 Dec 2020 01:37) (77 - 82)  BP: 137/70 (02 Dec 2020 01:37) (127/72 - 146/63)  BP(mean): --  RR: 18 (02 Dec 2020 01:37) (18 - 20)  SpO2: 95% (02 Dec 2020 01:37) (95% - 100%)    Examination  General: Well appearing male, NAD  HEENT: Scleral icterus bilaterally  Cardiac: RRR  Lungs: No increased WOB  Abdomen: Soft, mildly distended, non-tender, (-) Vogt's sign, (-) McBurney's point tenderness, (-) Rovsing sign  Extremities: No edema    LABS:                        12.0   5.28  )-----------( 180      ( 01 Dec 2020 06:59 )             35.2     12    134<L>  |  102  |  7   ----------------------------<  143<H>  3.6   |  22  |  0.78    Ca    8.5      01 Dec 2020 06:59  Phos  2.6       Mg     2.2         TPro  5.5<L>  /  Alb  3.1<L>  /  TBili  3.2<H>  /  DBili  2.2<H>  /  AST  77<H>  /  ALT  196<H>  /  AlkPhos  291<H>        Urinalysis Basic - ( 2020 21:10 )    Color: Yellow / Appearance: Clear / S.012 / pH: x  Gluc: x / Ketone: Small  / Bili: Small / Urobili: Negative   Blood: x / Protein: Trace / Nitrite: Negative   Leuk Esterase: Negative / RBC: 1 /hpf / WBC 0 /HPF   Sq Epi: x / Non Sq Epi: 0 /hpf / Bacteria: Negative        INs and OUTs:    20 @ 07:  -  20 @ 07:00  --------------------------------------------------------  IN: 475 mL / OUT: 0 mL / NET: 475 mL    20 @ 07:  -  20 @ 03:24  --------------------------------------------------------  IN: 0 mL / OUT: 1975 mL / NET: -1975 mL

## 2020-12-02 NOTE — DISCHARGE NOTE PROVIDER - HOSPITAL COURSE
61 yo male presented with abdominal pain and nausea, vomiting for 10 days.  In the ED he was febrile to 103, mildly tachycardic to 105, and HTN to 170/80. His labs reflect normal WBC count and normal lactate but a hyperbilirubinemia (3.9) and transaminitis (185/261) with ALk phos 369. CT then demonstrated cholelithiasis and cholecystitis without CBD dilation.  He had and MRCP which was negative for CBD stone. It showed sludge and CB wall thickening.  GI was consulted for possible ERCP which was not indicated given normal CBD.  They recommended an EGD as outpatient for gastric wall thickening seen on CT.  It was decided to have patient undergo a percutaneous cholecystostomy tube with IR on 12/2.  The patient tolerated the procedure well (see IR report for full details). 30cc of dark bile was aspirated and sent for culture. After procedure, diet was advanced as tolerated. Pain improved.      On day of discharge, the patient was tolerating diet, ambulating well and pain controlled. He will follow up with Dr. Peña in 1 week along with IR Dr. Cuba and GI Dr. Ngo. 61 yo male presented with abdominal pain and nausea, vomiting for 10 days.  In the ED he was febrile to 103, mildly tachycardic to 105, and HTN to 170/80. His labs reflect normal WBC count and normal lactate but a hyperbilirubinemia (3.9) and transaminitis (185/261) with ALk phos 369. CT then demonstrated cholelithiasis and cholecystitis without CBD dilation.  He was admitted to surgery NPO on IVF and Zosyn.  Given elevated total bilirubin, he had and MRCP which was negative for CBD stone. It showed sludge and CB wall thickening.  GI was consulted for possible ERCP which was not indicated given normal CBD.  They recommended an EGD as outpatient for gastric wall thickening seen on CT.  It was decided to have patient undergo a percutaneous cholecystostomy tube with IR on 12/2.  The patient tolerated the procedure well (see IR report for full details). 30cc of dark bile was aspirated and sent for culture. After procedure, diet was advanced as tolerated. Pain improved.      On day of discharge, the patient was tolerating diet, ambulating well and pain controlled. He will follow up with Dr. Peña in 1 week along with IR Dr. Cuba and GI Dr. Ngo. 59 yo male presented with abdominal pain and nausea, vomiting for 10 days.  In the ED he was febrile to 103, mildly tachycardic to 105, and HTN to 170/80. His labs reflect normal WBC count and normal lactate but a hyperbilirubinemia (3.9) and transaminitis (185/261) with ALk phos 369. CT then demonstrated cholelithiasis and cholecystitis without CBD dilation.  He was admitted to surgery NPO on IVF and Zosyn.  Given elevated total bilirubin, he had and MRCP which was negative for CBD stone. It showed sludge and CB wall thickening.  GI was consulted for possible ERCP which was not indicated given normal CBD.  They recommended an EGD as outpatient for gastric wall thickening seen on CT.  It was decided to have patient undergo a percutaneous cholecystostomy tube with IR on 12/2.  The patient tolerated the procedure well (see IR report for full details). 30cc of dark bile was aspirated and sent for culture. After procedure, diet was advanced as tolerated. Pain improved.      On day of discharge, the patient was tolerating diet, ambulating well and pain controlled. He will follow up with Dr. Peña in 2 week along with IR Dr. Cuba and GI Dr. Ngo.

## 2020-12-02 NOTE — CONSULT NOTE ADULT - ATTENDING COMMENTS
As above, I agree that it is indicated to perform percutaneous cholecystostomy tube.  Will perform with sedation.  THis evaluation occurred prior to the procedure.
60 y.o M w DM2, HLD w abd pain, total bili 3.9 and cholelithiasis, acute cholecystitis on CT A/P.  MRI/MRCP with gastric wall thickening, acute cholecystitis, and possible cystic duct obstruction (not well visualized).  Recommend surgical management of acute cholecystitis, can consider intra-op IOC to eval cystic duct.  No plans for ERCP right now given normal CBD.  Can consider non-urgent EGD to evaluate gastric wall thickening when acute processes resolved.    Thank you for this interesting consult.  Please call the advanced GI service with any questions or concerns.
As above, will perform percutaneous cholecystostomy.  COnsent obtained and reinforced.   Pt on Abx currently.

## 2020-12-02 NOTE — REVIEW OF SYSTEMS
[Abdominal Pain] : abdominal pain [Nausea] : nausea [Vomiting] : vomiting [Headache] : headache [Negative] : Psychiatric [FreeTextEntry4] : mild HA

## 2020-12-02 NOTE — DISCHARGE NOTE PROVIDER - CARE PROVIDER_API CALL
Yesica Conte  COLON/RECTAL SURGERY  [address], [address]  [place] [address]  Phone: [phone]  Fax: [phone]  Follow Up Time:     Lee Cuba  INTERVENTIONAL RADIOLOGY AND DIAGNOSTIC RADIOLOGY  [address], [place] [address]  Phone: [phone]  Fax: [phone]  Follow Up Time:     Anahi Ngo)  Internal Medicine  [address]  [place] [address]  Phone: [phone]  Fax: [phone]  Follow Up Time:

## 2020-12-02 NOTE — CHART NOTE - NSCHARTNOTEFT_GEN_A_CORE
POST-PROCEDURE NOTE    Patient is s/p Perc Cholecystomy     Subjective:  Patient reports no pain  Denies chest pain, shortness of breath, nausea, vomiting  Is not yet passing gas or having bowel movements  Not yet urinating independently or ambulating independently    Vital Signs Last 24 Hrs  T(C): 37.2 (02 Dec 2020 17:13), Max: 37.9 (01 Dec 2020 21:13)  T(F): 98.9 (02 Dec 2020 17:13), Max: 100.2 (01 Dec 2020 21:13)  HR: 77 (02 Dec 2020 17:13) (59 - 82)  BP: 142/74 (02 Dec 2020 17:13) (111/72 - 142/84)  BP(mean): 94 (02 Dec 2020 13:20) (85 - 94)  RR: 18 (02 Dec 2020 17:13) (12 - 20)  SpO2: 97% (02 Dec 2020 17:13) (95% - 99%)  I&O's Detail    01 Dec 2020 07:01  -  02 Dec 2020 07:00  --------------------------------------------------------  IN:    dextrose 5% + sodium chloride 0.9% w/ Additives: 1100 mL  Total IN: 1100 mL    OUT:    Oral Fluid: 0 mL    Voided (mL): 2175 mL  Total OUT: 2175 mL    Total NET: -1075 mL      02 Dec 2020 07:01  -  02 Dec 2020 18:17  --------------------------------------------------------  IN:    dextrose 5% + sodium chloride 0.9% w/ Additives: 1200 mL    IV PiggyBack: 100 mL  Total IN: 1300 mL    OUT:    Oral Fluid: 0 mL    Voided (mL): 1600 mL  Total OUT: 1600 mL    Total NET: -300 mL        piperacillin/tazobactam IVPB.. 3.375  enoxaparin Injectable 40  piperacillin/tazobactam IVPB.. 3.375    PAST MEDICAL & SURGICAL HISTORY:  Hypercholesterolemia    Diabetes mellitus    No significant past surgical history          Physical Exam:  General: NAD, resting comfortably in bed  Pulmonary: Nonlabored breathing, no respiratory distress  Cardiovascular: NSR  Abdominal: soft, nontender, nondistended. Drain with bilious output  Extremities: WWP      LABS:                        12.7   3.53  )-----------( 185      ( 02 Dec 2020 07:46 )             37.5     12-02    135  |  102  |  6<L>  ----------------------------<  152<H>  3.9   |  22  |  0.83    Ca    8.6      02 Dec 2020 07:46  Phos  2.8     12-02  Mg     2.1     12-02    TPro  6.0  /  Alb  3.2<L>  /  TBili  2.4<H>  /  DBili  1.7<H>  /  AST  63<H>  /  ALT  148<H>  /  AlkPhos  286<H>  12-02    PT/INR - ( 02 Dec 2020 09:21 )   PT: 13.4 sec;   INR: 1.14 ratio         PTT - ( 02 Dec 2020 09:21 )  PTT:31.5 sec  CAPILLARY BLOOD GLUCOSE      POCT Blood Glucose.: 142 mg/dL (02 Dec 2020 18:02)  POCT Blood Glucose.: 171 mg/dL (02 Dec 2020 13:32)  POCT Blood Glucose.: 133 mg/dL (02 Dec 2020 06:17)  POCT Blood Glucose.: 95 mg/dL (01 Dec 2020 23:49)      Radiology and Additional Studies:    Assessment:  The patient is a 60y Male who is now several hours post-op from a percutaneous cholecystostomy    Plan:  - Pain control as needed  - tolerating CLD  - DVT ppx  - OOB and ambulating as tolerated  - F/u AM labs    Green [phone]

## 2020-12-02 NOTE — END OF VISIT
[] : Resident [FreeTextEntry3] : Pt with RLQ pain on exam.  Needs ED evaluation to r/o appendicitis.

## 2020-12-02 NOTE — ASSESSMENT
[FreeTextEntry1] : Sr. Burciaga is a 60 year old gentleman from [facility] who has DM2 (A1c 7.4% most recently), HLD, who presents for acute visit for abdominal pain x 5d associated w/ nausea/vomiting, found to be febrile and tachycardic w/ tenderness in RLQ in clinic c/f appendicitis vs. diverticulitis vs. pyelo vs gastroenteritis.\par \par # appendicitis vs. diverticulitis vs. pyelo vs gastroenteritis.\par -abd pain associated w/ nausea and vomiting\par -Fever of 38.8, tachycardic 106\par -tenderness worse in RLQ\par -recommend patient go to ED for further eval including CT scan, and labwork

## 2020-12-02 NOTE — PHYSICAL EXAM
[Ill-Appearing] : ill-appearing [Normal Oropharynx] : the oropharynx was normal [Normal] : no respiratory distress, lungs were clear to auscultation bilaterally and no accessory muscle use [Normal S1, S2] : normal S1 and S2 [Soft] : abdomen soft [Non-distended] : non-distended [No Masses] : no abdominal mass palpated [No HSM] : no HSM [No Hernias] : no hernias [No CVA Tenderness] : no CVA  tenderness [de-identified] : hot feeling [de-identified] : tachycardic, regular rhythm [de-identified] : decreased BS, ttp of RLQ

## 2020-12-02 NOTE — DISCHARGE NOTE PROVIDER - PROVIDER TOKENS
PROVIDER:[TOKEN:[[id number]]],PROVIDER:[TOKEN:[[id number]]],PROVIDER:[TOKEN:[[id number]]]

## 2020-12-02 NOTE — PROGRESS NOTE ADULT - ASSESSMENT
60 y.o M with PMHx of DM2, HLD with intermittent N/V associated with bilateral lower quadrant pain, fever, elevated transaminases, total bili 3.9 and cholelithiasis, acute cholecystitis on CT A/P. MRCP 12/1 negative for choledocolithiasis.    Plan:  - Diet: NPO  - IVF (NS for hyponatremia)  - Start IV antibiotics: Zosyn  - will monitor closely given possibility of cholangitis, currently low suspicion  - likely will need cholecystectomy  - Pain and fever management: IV Tylenol  - lovenox for DVT ppx  - F/u AM labs    [facility] [phone] 60 y.o M with PMHx of DM2, HLD with intermittent N/V associated with bilateral lower quadrant pain, fever, elevated transaminases, total bili 3.9 and cholelithiasis, acute cholecystitis on CT A/P. MRCP 12/1 negative for choledocolithiasis.    Plan:  - Diet: NPO  - IVF (NS for hyponatremia)  - Zosyn  - IR consult for perc shukri  - Pain and fever management: IV Tylenol  - lovenox for DVT ppx  - F/u AM labs    [facility] [phone]

## 2020-12-02 NOTE — PROGRESS NOTE ADULT - SUBJECTIVE AND OBJECTIVE BOX
Interventional Radiology Procedure Note    Procedure:  Cholecystostomy     Indication: acute cholecystitis    Operators: Richard Cuba    Anesthesia (type): IV sedation    Contrast: 8 cc    EBL: minimal    Findings/Follow up Plan of Care:  Successful percutaneous cholecystostomy using US and fluoro guidance.  Dark bile obtained and sent for culture.  Approx 30 cc obtained.  Post-images demonstrate tube in GB with stone in cystic duct.  Full report to follow.    Specimens Removed: culture    Implants: 8 [place]    Complications: none    Condition/Disposition: PACU then floors    Please call Interventional Radiology [phone] with any questions, concerns, or issues.

## 2020-12-02 NOTE — DISCHARGE NOTE PROVIDER - NSDCMRMEDTOKEN_GEN_ALL_CORE_FT
atorvastatin 40 mg oral tablet: 1 tab(s) orally once a day  glimepiride 2 mg oral tablet: 1.5 tab(s) orally once a day with breakfast  metFORMIN 1000 mg oral tablet: 1 tab(s) orally 2 times a day   acetaminophen 500 mg oral tablet: 2 tab(s) orally every 6 hours, As needed,  Mild Pain (1 - 3),  atorvastatin 40 mg oral tablet: 1 tab(s) orally once a day  Augmentin 875 mg-125 mg oral tablet: 1  orally 2 times a day   glimepiride 2 mg oral tablet: 1.5 tab(s) orally once a day with breakfast  metFORMIN 1000 mg oral tablet: 1 tab(s) orally 2 times a day  oxyCODONE 5 mg oral tablet: 1 tab(s) orally every 6 hours, As Needed -Moderate Pain (4 - 6) MDD:4

## 2020-12-02 NOTE — PROGRESS NOTE ADULT - ATTENDING COMMENTS
I saw and examined the pt and discussed the tx plan with the House Staff. I agree with the exam and plan as documented in the surgery resident's note from today with comments/changes below.  + fever.  Given elevated LFTs, duration of symptoms and GB appearance on CT, it would be best to plan for interval lap shukri and treat with perc shukri at this time, as he has a high risk for morbidity, open procedure, chance of subtotal cholecystectomy at this time. Will consult PO.   Yesica Conte MD

## 2020-12-02 NOTE — DISCHARGE NOTE PROVIDER - CARE PROVIDERS DIRECT ADDRESSES
,[email or web address],DirectAddress_Unknown

## 2020-12-02 NOTE — DISCHARGE NOTE PROVIDER - NSDCFUADDAPPT_GEN_ALL_CORE_FT
You had gastric wall thickening seen on imaging during your hospitalization.  You will need further evaluation with upper endoscopy is recommended as outpatient- You can follow up with [facility] Dr. Ngo in 2 weeks [phone]

## 2020-12-02 NOTE — CHART NOTE - NSCHARTNOTEFT_GEN_A_CORE
Patient case reviewed with advanced GI team. There is no evidence of choledochilithiasis. Plan for OR/IR today for management of cholecytitis. Can consider IOC to evaluate cystic duct. Nonurgent EGD to evaluate for gastritis, likely in the setting of nausea/vomiting. No further plans from GI team. Please call back GI team as needed.    IMPRESSION:    The gallbladder is filled with bile and sludge to the distal neck where there is an abrupt cut off. The wall is markedly thickened. Findings are suggestive of acute cholecystitis. No gallstones are seen. The cystic duct is not clearly imaged. An obstructive or other process at the cystic duct cannot be excluded.    No choledocholithiasis.    Persistent gastric wall thickening. Evaluation with upper endoscopy is recommended.

## 2020-12-02 NOTE — CONSULT NOTE ADULT - SUBJECTIVE AND OBJECTIVE BOX
Vascular & Interventional Radiology Pre-Procedure Note    Procedure Name:    HPI:   60 y.o M with PMHx of DM2, HLD with intermittent N/V associated with bilateral lower quadrant pain, fever, elevated transaminases, total bili 3.9 and cholelithiasis, acute cholecystitis on CT A/P. MRCP 12/1 negative for choledocolithiasis.    IR consulted for percutaneous cholecystostomy.     Allergies:   Medications (Abx/Cardiac/Anticoagulation/Blood Products)  enoxaparin Injectable: 40 milliGRAM(s) SubCutaneous (12-01 @ 12:27)  piperacillin/tazobactam IVPB..: 25 mL/Hr IV Intermittent (12-02 @ 06:13)  piperacillin/tazobactam IVPB...: 200 mL/Hr IV Intermittent (11-30 @ 23:34)    Data:    88.5  T(C): 36.8  HR: 61  BP: 118/69  RR: 18  SpO2: 98%    Exam  General: NAD, AAO x3    -WBC 3.53 / HgB 12.7 / Hct 37.5 / Plt 185  -Na 135 / Cl 102 / BUN 6 / Glucose 152  -K 3.9 / CO2 22 / Cr 0.83  - / Alk Phos 286 / T.Bili 2.4  -INR1.14    Imaging:   CT abdomen/pelvis reviewed    Plan:   -60y Male presents for percutaneous cholecystostomy tube for acute cholecystitis.  -Risks/Benefits/alternatives explained with the patient and/or healthcare proxy and witnessed informed consent obtained.    Vascular & Interventional Radiology Pre-Procedure Note    Procedure Name:    HPI:   60 y.o M with PMHx of DM2, HLD with intermittent N/V associated with bilateral lower quadrant pain, fever, elevated transaminases, total bili 3.9 and cholelithiasis, acute cholecystitis on CT A/P. MRCP 12/1 negative for choledocolithiasis.    IR consulted for percutaneous cholecystostomy.     Allergies:   Medications (Abx/Cardiac/Anticoagulation/Blood Products)  enoxaparin Injectable: 40 milliGRAM(s) SubCutaneous (12-01 @ 12:27)  piperacillin/tazobactam IVPB..: 25 mL/Hr IV Intermittent (12-02 @ 06:13)  piperacillin/tazobactam IVPB...: 200 mL/Hr IV Intermittent (11-30 @ 23:34)    Data:    88.5  T(C): 36.8  HR: 61  BP: 118/69  RR: 18  SpO2: 98%    Exam  General: NAD, AAO x3    -WBC 3.53 / HgB 12.7 / Hct 37.5 / Plt 185  -Na 135 / Cl 102 / BUN 6 / Glucose 152  -K 3.9 / CO2 22 / Cr 0.83  - / Alk Phos 286 / T.Bili 2.4  -INR1.14    Imaging:   CT abdomen/pelvis reviewed    Plan:   -60y Male presents for percutaneous cholecystostomy tube for acute cholecystitis.  -Risks/Benefits/alternatives explained with the patient and witnessed informed consent obtained.

## 2020-12-02 NOTE — DISCHARGE NOTE PROVIDER - NSDCFUADDINST_GEN_ALL_CORE_FT
WOUND CARE: You will be discharged with cholecystostomy tube. You will need to empty it and record output accurately. This will be taught to you by the nursing staff. Please do not remove the drain.  Please keep catheter to gravity drainage.  Flush cholecystostomy tube wiht 5 cc NS forward only once daily - do not aspirate back.  BATHING: Please do not submerge drain underwater. You may shower and/or sponge bathe. Please pat area dry after showering.   ACTIVITY: No heavy lifting anything more than 10-15lbs or straining. Otherwise, you may return to your usual level of physical activity. If you are taking narcotic pain medication (such as oxycodone or Percocet), do NOT drive a car, operate machinery or make important decisions. NOTIFY YOUR SURGEON IF: You have any excessive output from drain, redness or swelling around drain site, any fever (over 100.4 F) or chills, persistent nausea/vomiting with inability to tolerate food or liquids, persistent diarrhea, or if your pain is not controlled on your discharge pain medications. FOLLOW-UP: 1. Please call to make a follow-up appointment in 2 weeks upon discharge from the hospital with Dr. Peña 2. Please follow up with your primary care physician in one week regarding your hospitalization.

## 2020-12-02 NOTE — HISTORY OF PRESENT ILLNESS
[FreeTextEntry8] : Sr. Burciaga is a 60 year old gentleman from [facility] who has DM2 (A1c 7.4% most recently), HLD, who presents for acute visit for abdominal pain.\par Intermittent bilateral 6/10 lower  abdominal pain x 1 week, not progressive. Not associated with eating. At first thought it was food poisoning related as he had a bagel with tuna, but since it has persisted he was concerned it could be something else.\par Associated with nausea/vomiting -5x NBNB vomiting on Saturday. Would vomit ~10 minutes after eating. Last vomiting was yesterday morning.\par Denies any diarrhea, fever, weight changes, lightheadedness or dizzy. Denies any hx of constipation, diverticulosis. No hx of abd surgeries. BM this AM, normal appearance.Never had such symptoms in the past.\par Assocaited with minor HA. Feels some fatigue. Has not tried any medicine for pain so far. Has had DM for 15 years.\par No cough, or sick contacts. Had COVID PCR rapid test today at urgent care- negative. Reporting some R flank 3/10 pain associated with shaking but no fevers. \par Reports urine is 'still yellow' despite drinking a lot of water. Feeling very thirsty. Denies urinary frequency, or dysuria.

## 2020-12-02 NOTE — DISCHARGE NOTE PROVIDER - NSDCCPCAREPLAN_GEN_ALL_CORE_FT
PRINCIPAL DISCHARGE DIAGNOSIS  Diagnosis: Acute cholecystitis  Assessment and Plan of Treatment: WOUND CARE: You will be discharged with cholecystostomy tube. You will need to empty it and record output accurately. This will be taught to you by the nursing staff. Please do not remove the drain.  Please keep catheter to gravity drainage.  Flush cholecystostomy tube wiht 5 cc NS forward only once daily - do not aspirate back.   BATHING: Please do not submerge drain underwater. You may shower and/or sponge bathe. Please pat area dry after showering.    ACTIVITY: No heavy lifting anything more than 10-15lbs or straining. Otherwise, you may return to your usual level of physical activity. If you are taking narcotic pain medication (such as oxycodone or Percocet), do NOT drive a car, operate machinery or make important decisions.  NOTIFY YOUR SURGEON IF: You have any excessive output from drain, redness or swelling around drain site, any fever (over 100.4 F) or chills, persistent nausea/vomiting with inability to tolerate food or liquids, persistent diarrhea, or if your pain is not controlled on your discharge pain medications.  FOLLOW-UP:  1. Please call to make a follow-up appointment in 1-2 weeks upon discharge from the hospital with Dr. Peña  2. Please follow up with your primary care physician in one week regarding your hospitalization.

## 2020-12-03 LAB
ALBUMIN SERPL ELPH-MCNC: 3 G/DL — LOW (ref 3.3–5)
ALP SERPL-CCNC: 360 U/L — HIGH (ref 40–120)
ALT FLD-CCNC: 125 U/L — HIGH (ref 10–45)
ANION GAP SERPL CALC-SCNC: 10 MMOL/L — SIGNIFICANT CHANGE UP (ref 5–17)
AST SERPL-CCNC: 55 U/L — HIGH (ref 10–40)
BILIRUB DIRECT SERPL-MCNC: 1.5 MG/DL — HIGH (ref 0–0.2)
BILIRUB INDIRECT FLD-MCNC: 1 MG/DL — SIGNIFICANT CHANGE UP (ref 0.2–1)
BILIRUB SERPL-MCNC: 2.5 MG/DL — HIGH (ref 0.2–1.2)
BUN SERPL-MCNC: 6 MG/DL — LOW (ref 7–23)
CALCIUM SERPL-MCNC: 9 MG/DL — SIGNIFICANT CHANGE UP (ref 8.4–10.5)
CHLORIDE SERPL-SCNC: 98 MMOL/L — SIGNIFICANT CHANGE UP (ref 96–108)
CO2 SERPL-SCNC: 24 MMOL/L — SIGNIFICANT CHANGE UP (ref 22–31)
CREAT SERPL-MCNC: 0.86 MG/DL — SIGNIFICANT CHANGE UP (ref 0.5–1.3)
GLUCOSE BLDC GLUCOMTR-MCNC: 210 MG/DL — HIGH (ref 70–99)
GLUCOSE BLDC GLUCOMTR-MCNC: 220 MG/DL — HIGH (ref 70–99)
GLUCOSE BLDC GLUCOMTR-MCNC: 223 MG/DL — HIGH (ref 70–99)
GLUCOSE BLDC GLUCOMTR-MCNC: 240 MG/DL — HIGH (ref 70–99)
GLUCOSE SERPL-MCNC: 240 MG/DL — HIGH (ref 70–99)
HCT VFR BLD CALC: 37.2 % — LOW (ref 39–50)
HGB BLD-MCNC: 12.8 G/DL — LOW (ref 13–17)
MAGNESIUM SERPL-MCNC: 2.1 MG/DL — SIGNIFICANT CHANGE UP (ref 1.6–2.6)
MCHC RBC-ENTMCNC: 28.8 PG — SIGNIFICANT CHANGE UP (ref 27–34)
MCHC RBC-ENTMCNC: 34.4 GM/DL — SIGNIFICANT CHANGE UP (ref 32–36)
MCV RBC AUTO: 83.8 FL — SIGNIFICANT CHANGE UP (ref 80–100)
NRBC # BLD: 0 /100 WBCS — SIGNIFICANT CHANGE UP (ref 0–0)
PHOSPHATE SERPL-MCNC: 2.7 MG/DL — SIGNIFICANT CHANGE UP (ref 2.5–4.5)
PLATELET # BLD AUTO: 180 K/UL — SIGNIFICANT CHANGE UP (ref 150–400)
POTASSIUM SERPL-MCNC: 4.3 MMOL/L — SIGNIFICANT CHANGE UP (ref 3.5–5.3)
POTASSIUM SERPL-SCNC: 4.3 MMOL/L — SIGNIFICANT CHANGE UP (ref 3.5–5.3)
PROT SERPL-MCNC: 6.1 G/DL — SIGNIFICANT CHANGE UP (ref 6–8.3)
RBC # BLD: 4.44 M/UL — SIGNIFICANT CHANGE UP (ref 4.2–5.8)
RBC # FLD: 13.4 % — SIGNIFICANT CHANGE UP (ref 10.3–14.5)
SODIUM SERPL-SCNC: 132 MMOL/L — LOW (ref 135–145)
WBC # BLD: 4.7 K/UL — SIGNIFICANT CHANGE UP (ref 3.8–10.5)
WBC # FLD AUTO: 4.7 K/UL — SIGNIFICANT CHANGE UP (ref 3.8–10.5)

## 2020-12-03 PROCEDURE — 99232 SBSQ HOSP IP/OBS MODERATE 35: CPT | Mod: GC

## 2020-12-03 RX ORDER — OXYCODONE HYDROCHLORIDE 5 MG/1
5 TABLET ORAL EVERY 4 HOURS
Refills: 0 | Status: DISCONTINUED | OUTPATIENT
Start: 2020-12-03 | End: 2020-12-05

## 2020-12-03 RX ORDER — PANTOPRAZOLE SODIUM 20 MG/1
40 TABLET, DELAYED RELEASE ORAL
Refills: 0 | Status: DISCONTINUED | OUTPATIENT
Start: 2020-12-03 | End: 2020-12-05

## 2020-12-03 RX ORDER — SODIUM CHLORIDE 9 MG/ML
1000 INJECTION INTRAMUSCULAR; INTRAVENOUS; SUBCUTANEOUS
Refills: 0 | Status: DISCONTINUED | OUTPATIENT
Start: 2020-12-03 | End: 2020-12-04

## 2020-12-03 RX ADMIN — PIPERACILLIN AND TAZOBACTAM 25 GRAM(S): 4; .5 INJECTION, POWDER, LYOPHILIZED, FOR SOLUTION INTRAVENOUS at 05:40

## 2020-12-03 RX ADMIN — Medication 2: at 08:51

## 2020-12-03 RX ADMIN — PIPERACILLIN AND TAZOBACTAM 25 GRAM(S): 4; .5 INJECTION, POWDER, LYOPHILIZED, FOR SOLUTION INTRAVENOUS at 14:10

## 2020-12-03 RX ADMIN — PIPERACILLIN AND TAZOBACTAM 25 GRAM(S): 4; .5 INJECTION, POWDER, LYOPHILIZED, FOR SOLUTION INTRAVENOUS at 22:20

## 2020-12-03 RX ADMIN — Medication 83.33 MILLIMOLE(S): at 11:20

## 2020-12-03 RX ADMIN — DEXTROSE MONOHYDRATE, SODIUM CHLORIDE, AND POTASSIUM CHLORIDE 100 MILLILITER(S): 50; .745; 4.5 INJECTION, SOLUTION INTRAVENOUS at 05:40

## 2020-12-03 RX ADMIN — Medication 2: at 14:09

## 2020-12-03 RX ADMIN — SODIUM CHLORIDE 50 MILLILITER(S): 9 INJECTION INTRAMUSCULAR; INTRAVENOUS; SUBCUTANEOUS at 08:51

## 2020-12-03 RX ADMIN — ENOXAPARIN SODIUM 40 MILLIGRAM(S): 100 INJECTION SUBCUTANEOUS at 11:23

## 2020-12-03 RX ADMIN — Medication 2: at 18:15

## 2020-12-03 RX ADMIN — PANTOPRAZOLE SODIUM 40 MILLIGRAM(S): 20 TABLET, DELAYED RELEASE ORAL at 11:23

## 2020-12-03 NOTE — PROGRESS NOTE ADULT - SUBJECTIVE AND OBJECTIVE BOX
Interval events: s/p percutaneous cholecystomy     SUBJECTIVE:  Reports pain well controlled. Tolerating clear liquid diet. Denies N/V. Bowel Function +/-  Ambulating independently       OBJECTIVE:  Vital Signs Last 24 Hrs  T(C): 37.6 (03 Dec 2020 00:19), Max: 37.6 (03 Dec 2020 00:19)  T(F): 99.7 (03 Dec 2020 00:19), Max: 99.7 (03 Dec 2020 00:19)  HR: 70 (03 Dec 2020 00:19) (59 - 77)  BP: 123/70 (03 Dec 2020 00:19) (111/72 - 142/74)  BP(mean): 94 (02 Dec 2020 13:20) (85 - 94)  RR: 18 (03 Dec 2020 00:19) (12 - 20)  SpO2: 97% (03 Dec 2020 00:19) (96% - 99%)    Physical Examination:  GEN: NAD, resting quietly  PULM: symmetric chest rise bilaterally, no increased WOB  ABD: soft, nontender, nondistended, drain w/ bilious output  EXTR: no LE erythema, moving all extremities      LABS:                        12.7   3.53  )-----------( 185      ( 02 Dec 2020 07:46 )             37.5       12-02    135  |  102  |  6<L>  ----------------------------<  152<H>  3.9   |  22  |  0.83    Ca    8.6      02 Dec 2020 07:46  Phos  2.8     12-02  Mg     2.1     12-02    TPro  6.0  /  Alb  3.2<L>  /  TBili  2.4<H>  /  DBili  1.7<H>  /  AST  63<H>  /  ALT  148<H>  /  AlkPhos  286<H>  12-02

## 2020-12-03 NOTE — PROGRESS NOTE ADULT - ATTENDING COMMENTS
60M with PMHx of DM2, HLD presents with N/V, abdominal pain, fever, elevated transaminases, total bili 3.9 and cholelithiasis with c/f acute cholecystitis on CT A/P. MRCP 12/1 negative for choledocholithiasis. Now s/p perc shukri with cystic duct obstruction noted however concern for CBD stones per surgery given persistent elevation of TB.    Will plan for repeat liver enzymes in the AM  If still elevated or rising, can plan for diagnostic EUS to more definitively rule out CBD stones  Keep NPO after midnight  Continue management of acute shukri per IR/surgery    Thank you for this interesting consult.  Please call the advanced GI service with any questions or concerns.

## 2020-12-03 NOTE — PROGRESS NOTE ADULT - ATTENDING COMMENTS
I saw and examined the pt and discussed the tx plan with the House Staff. I agree with the exam and plan as documented in the surgery resident's note from today with comments/changes below.  Feels better.  Tolerated clears.  Perc shukri tube in place.   ADAT.  Home this afternoon if no issues with one week of Augmentin, f/u with me in 2 weeks to discuss interval lap shukri.   Yesica Conte MD I saw and examined the pt and discussed the tx plan with the House Staff. I agree with the exam and plan as documented in the surgery resident's note from today with comments/changes below.  Feels better.  Tolerated clears.  Perc shukri tube in place.   ADAT.  Home this afternoon if no issues with one week of Augmentin, f/u with me in 2 weeks to discuss interval lap shukri.   LFTs lingering elevated for bili and alk phos. Plan for repeat as outpt in one week.   Yesica Conte MD I saw and examined the pt and discussed the tx plan with the House Staff. I agree with the exam and plan as documented in the surgery resident's note from today with comments/changes below.  Feels better.  Tolerated clears.  Perc shukri tube in place.   ADAT.  LFTs lingering elevated for bili and alk phos.   MRCP with no gallstones or CBD stones, + acute shukri, ? obstructive process in the cystic duct area.   Unclear picture given persistently elevated LFTs and MRCP findings.  Will discuss further with Radiology and advanced GI.  Yesica Conte MD

## 2020-12-03 NOTE — PROGRESS NOTE ADULT - SUBJECTIVE AND OBJECTIVE BOX
Interventional Radiology Follow- Up Note      This is a 60y Male s/p perc shukri on 12/2 in Interventional Radiology with Dr. Cuba.     Patient seen and examined @ bedside. Patient tolerating clear liquid diet. Ambulating as tolerated. No other complaints offered.     Vitals: T(F): 97.8 (12-03-20 @ 09:15), Max: 99.7 (12-03-20 @ 00:19)  HR: 71 (12-03-20 @ 09:15) (59 - 77)  BP: 121/78 (12-03-20 @ 09:15) (111/72 - 142/74)  RR: 18 (12-03-20 @ 09:15) (12 - 20)  SpO2: 98% (12-03-20 @ 09:15) (96% - 99%)  Wt(kg): --    LABS:                        12.8   4.70  )-----------( 180      ( 03 Dec 2020 06:53 )             37.2     12-03    132<L>  |  98  |  6<L>  ----------------------------<  240<H>  4.3   |  24  |  0.86    Ca    9.0      03 Dec 2020 06:53  Phos  2.7     12-03  Mg     2.1     12-03    TPro  6.1  /  Alb  3.0<L>  /  TBili  2.5<H>  /  DBili  1.5<H>  /  AST  55<H>  /  ALT  125<H>  /  AlkPhos  360<H>  12-03    PT/INR - ( 02 Dec 2020 09:21 )   PT: 13.4 sec;   INR: 1.14 ratio       PTT - ( 02 Dec 2020 09:21 )  PTT:31.5 sec    Antibiotics: Zosyn IV    Culture - Body Fluid with Gram Stain (12.02.20 @ 14:55)    Gram Stain:   Few polymorphonuclear leukocytes per low power field  Few Gram Positive Cocci in Pairs and Chains per oil power field    Specimen Source: .Body Fluid Bile Fluid    24hr Drain output: 30cc    PHYSICAL EXAM:  General: Nontoxic, in NAD, A&O x3  Abdomen: soft, NTND  Extremities: no pedal edema or calf tenderness noted   Drain device: Drain intact attached to gravity drainage bag, dressing clean, dry, intact      Assessment/Plan:    Impression: This is a 60 year old male with PMHx of DM2, HLD with intermittent N/V associated with bilateral lower quadrant pain, fever, elevated transaminases, total bili 3.9 and cholelithiasis, acute cholecystitis on CT A/P. MRCP 12/1 negative for choledocolithiasis. Now s/p percutaneous cholecystostomy.       -continue to monitor daily output    -flush drain with 5cc NS forward only once daily; do not aspirate  -Regarding outpatient drainage follow up with IR, if the pt is d/c home with drainage catheter then they can make an appointment with IR by calling IR booking office at [phone].   -Recommend follow up with IR for shukri tube check in 6 weeks after initial placement of drain followed by every 3 months for routine evaluation and exchange.  -They will benefit from VNS service to help with drainage catheter care.  They should continue the same drainage catheter care as an outpatient.   -Patient should follow up with surgery to determine if the patient is a surgical candidate for cholecystectomy.     Please call IR at extension [phone] with any questions, concerns, or issues regarding above.        Nupur Awad, [facility]  Spectra # [phone]

## 2020-12-03 NOTE — PROGRESS NOTE ADULT - ASSESSMENT
60 y.o M with PMHx of DM2, HLD with intermittent N/V associated with bilateral lower quadrant pain, fever, elevated transaminases, total bili 3.9 and cholelithiasis, acute cholecystitis on CT A/P. MRCP 12/1 negative for choledocolithiasis. Now s/p percutaneous cholecystostomy.     Plan:  - Diet: CLD, adv as tolerated   - IVF (NS for hyponatremia)  - Zosyn  - Pain and fever management: IV Tylenol  - lovenox for DVT ppx  - F/u AM labs    Green Surgery [phone]

## 2020-12-03 NOTE — PROGRESS NOTE ADULT - ASSESSMENT
Pt is a 61 yo M with PMHx of DM2, HLD presents with N/V, abdominal pain, fever, elevated transaminases, total bili 3.9 and cholelithiasis with c/f acute cholecystitis on CT A/P. MRCP 12/1 negative for choledocholithiasis Now s/p percutaneous cholecystostomy.     Impression:  # Elevated liver enzymes: Tbili still 2.5. There is a cystic duct stone however no evidence of CBD stone. Maybe related to cholecystitis and bili lagging to downtrend. Possibly missed stone/sludge on MRCP. Non septic now but initially with sepsis given fever and tachycardia.  # Gastric thickening: noted on CT. Non specific findings. Possible PUD or gastritis in setting of n/v. Can also explain symptoms of n/v.   # Acute cholecystitis  # T2DM  # Hypertension     Recommendation:  - trend CBC, CMP, INR  - if tbili not improving tmro morning, will plan for EUS to evaluate CBD  - please make NPO at midnight in the event EUS is warranted  - antibiotics per primary team  - will need CCY, timing pending clinical course  - ppi daily   - supportive care

## 2020-12-03 NOTE — PROGRESS NOTE ADULT - SUBJECTIVE AND OBJECTIVE BOX
Chief Complaint:  Patient is a 60y old  Male who presents with a chief complaint of cholecystitis (02 Dec 2020 12:27)      Interval Events: GI reconsulted for c/f elevated LFTs. Patient without pain, n/v.     Allergies:  No Known Allergies      Hospital Medications:  acetaminophen   Tablet .. 1000 milliGRAM(s) Oral every 6 hours PRN  dextrose 40% Gel 15 Gram(s) Oral once  dextrose 5%. 1000 milliLiter(s) IV Continuous <Continuous>  dextrose 5%. 1000 milliLiter(s) IV Continuous <Continuous>  dextrose 50% Injectable 25 Gram(s) IV Push once  dextrose 50% Injectable 12.5 Gram(s) IV Push once  dextrose 50% Injectable 25 Gram(s) IV Push once  enoxaparin Injectable 40 milliGRAM(s) SubCutaneous daily  glucagon  Injectable 1 milliGRAM(s) IntraMuscular once  HYDROmorphone  Injectable 0.5 milliGRAM(s) IV Push every 6 hours PRN  insulin lispro (ADMELOG) corrective regimen sliding scale   SubCutaneous three times a day before meals  insulin lispro (ADMELOG) corrective regimen sliding scale   SubCutaneous at bedtime  pantoprazole  Injectable 40 milliGRAM(s) IV Push daily  piperacillin/tazobactam IVPB.. 3.375 Gram(s) IV Intermittent every 8 hours  sodium chloride 0.9%. 1000 milliLiter(s) IV Continuous <Continuous>      PMHX/PSHX:  Hypercholesterolemia    Diabetes mellitus    No significant past surgical history        Family history:  Family history of diabetes mellitus    Family history of myocardial infarction        ROS: As per HPI, 14-point ROS negative otherwise.    General:  No wt loss, fevers, chills, night sweats, fatigue,   Eyes:  Good vision, no reported pain  ENT:  No sore throat, pain, runny nose, dysphagia  CV:  No pain, palpitations, hypo/hypertension  Resp:  No dyspnea, cough, tachypnea, wheezing  GI:  See HPI  :  No pain, bleeding, incontinence, nocturia  Muscle:  No pain, weakness  Neuro:  No weakness, tingling, memory problems  Psych:  No fatigue, insomnia, mood problems, depression  Endocrine:  No polyuria, polydipsia, cold/heat intolerance  Heme:  No petechiae, ecchymosis, easy bruisability  Skin:  No rash, edema      PHYSICAL EXAM:     Vital Signs:  Vital Signs Last 24 Hrs  T(C): 36.6 (03 Dec 2020 09:15), Max: 37.6 (03 Dec 2020 00:19)  T(F): 97.8 (03 Dec 2020 09:15), Max: 99.7 (03 Dec 2020 00:19)  HR: 71 (03 Dec 2020 09:15) (66 - 77)  BP: 121/78 (03 Dec 2020 09:15) (121/78 - 142/74)  BP(mean): --  RR: 18 (03 Dec 2020 09:15) (18 - 18)  SpO2: 98% (03 Dec 2020 09:15) (96% - 98%)  Daily     Daily     GENERAL:  appears comfortable, no acute distress  HEENT:  NC/AT,  conjunctivae clear, sclera -anicteric  CHEST:  no increased effort  HEART:  Regular rate and rhythm  ABDOMEN:  Soft, non-tender, non-distended,  drain in place  EXTREMITIES:  no cyanosis, clubbing or edema  SKIN:  No rash/erythema/ecchymoses/petechiae/wounds  NEURO:  Alert, oriented    LABS:                        12.8   4.70  )-----------( 180      ( 03 Dec 2020 06:53 )             37.2     12-03    132<L>  |  98  |  6<L>  ----------------------------<  240<H>  4.3   |  24  |  0.86    Ca    9.0      03 Dec 2020 06:53  Phos  2.7     12-03  Mg     2.1     12-03    TPro  6.1  /  Alb  3.0<L>  /  TBili  2.5<H>  /  DBili  1.5<H>  /  AST  55<H>  /  ALT  125<H>  /  AlkPhos  360<H>  12-03    LIVER FUNCTIONS - ( 03 Dec 2020 06:53 )  Alb: 3.0 g/dL / Pro: 6.1 g/dL / ALK PHOS: 360 U/L / ALT: 125 U/L / AST: 55 U/L / GGT: x           PT/INR - ( 02 Dec 2020 09:21 )   PT: 13.4 sec;   INR: 1.14 ratio         PTT - ( 02 Dec 2020 09:21 )  PTT:31.5 sec        Imaging:

## 2020-12-04 ENCOUNTER — RESULT REVIEW (OUTPATIENT)
Age: 60
End: 2020-12-04

## 2020-12-04 LAB
-  AMPICILLIN: SIGNIFICANT CHANGE UP
-  TETRACYCLINE: SIGNIFICANT CHANGE UP
-  VANCOMYCIN: SIGNIFICANT CHANGE UP
ALBUMIN SERPL ELPH-MCNC: 3.9 G/DL — SIGNIFICANT CHANGE UP (ref 3.3–5)
ALP SERPL-CCNC: 481 U/L — HIGH (ref 40–120)
ALT FLD-CCNC: 174 U/L — HIGH (ref 10–45)
ANION GAP SERPL CALC-SCNC: 14 MMOL/L — SIGNIFICANT CHANGE UP (ref 5–17)
AST SERPL-CCNC: 109 U/L — HIGH (ref 10–40)
BILIRUB SERPL-MCNC: 3 MG/DL — HIGH (ref 0.2–1.2)
BUN SERPL-MCNC: 8 MG/DL — SIGNIFICANT CHANGE UP (ref 7–23)
CALCIUM SERPL-MCNC: 9.4 MG/DL — SIGNIFICANT CHANGE UP (ref 8.4–10.5)
CHLORIDE SERPL-SCNC: 99 MMOL/L — SIGNIFICANT CHANGE UP (ref 96–108)
CO2 SERPL-SCNC: 24 MMOL/L — SIGNIFICANT CHANGE UP (ref 22–31)
CREAT SERPL-MCNC: 0.88 MG/DL — SIGNIFICANT CHANGE UP (ref 0.5–1.3)
GLUCOSE BLDC GLUCOMTR-MCNC: 170 MG/DL — HIGH (ref 70–99)
GLUCOSE BLDC GLUCOMTR-MCNC: 185 MG/DL — HIGH (ref 70–99)
GLUCOSE BLDC GLUCOMTR-MCNC: 191 MG/DL — HIGH (ref 70–99)
GLUCOSE BLDC GLUCOMTR-MCNC: 239 MG/DL — HIGH (ref 70–99)
GLUCOSE SERPL-MCNC: 187 MG/DL — HIGH (ref 70–99)
HCT VFR BLD CALC: 41.8 % — SIGNIFICANT CHANGE UP (ref 39–50)
HGB BLD-MCNC: 14.5 G/DL — SIGNIFICANT CHANGE UP (ref 13–17)
MAGNESIUM SERPL-MCNC: 2.3 MG/DL — SIGNIFICANT CHANGE UP (ref 1.6–2.6)
MCHC RBC-ENTMCNC: 29.1 PG — SIGNIFICANT CHANGE UP (ref 27–34)
MCHC RBC-ENTMCNC: 34.7 GM/DL — SIGNIFICANT CHANGE UP (ref 32–36)
MCV RBC AUTO: 83.9 FL — SIGNIFICANT CHANGE UP (ref 80–100)
METHOD TYPE: SIGNIFICANT CHANGE UP
NRBC # BLD: 0 /100 WBCS — SIGNIFICANT CHANGE UP (ref 0–0)
PHOSPHATE SERPL-MCNC: 3.4 MG/DL — SIGNIFICANT CHANGE UP (ref 2.5–4.5)
PLATELET # BLD AUTO: 245 K/UL — SIGNIFICANT CHANGE UP (ref 150–400)
POTASSIUM SERPL-MCNC: 3.8 MMOL/L — SIGNIFICANT CHANGE UP (ref 3.5–5.3)
POTASSIUM SERPL-SCNC: 3.8 MMOL/L — SIGNIFICANT CHANGE UP (ref 3.5–5.3)
PROT SERPL-MCNC: 7.2 G/DL — SIGNIFICANT CHANGE UP (ref 6–8.3)
RBC # BLD: 4.98 M/UL — SIGNIFICANT CHANGE UP (ref 4.2–5.8)
RBC # FLD: 13.6 % — SIGNIFICANT CHANGE UP (ref 10.3–14.5)
SODIUM SERPL-SCNC: 137 MMOL/L — SIGNIFICANT CHANGE UP (ref 135–145)
WBC # BLD: 6.97 K/UL — SIGNIFICANT CHANGE UP (ref 3.8–10.5)
WBC # FLD AUTO: 6.97 K/UL — SIGNIFICANT CHANGE UP (ref 3.8–10.5)

## 2020-12-04 PROCEDURE — 88305 TISSUE EXAM BY PATHOLOGIST: CPT | Mod: 26

## 2020-12-04 PROCEDURE — 43259 EGD US EXAM DUODENUM/JEJUNUM: CPT | Mod: GC

## 2020-12-04 PROCEDURE — 43239 EGD BIOPSY SINGLE/MULTIPLE: CPT | Mod: GC

## 2020-12-04 PROCEDURE — 88312 SPECIAL STAINS GROUP 1: CPT | Mod: 26

## 2020-12-04 RX ORDER — SODIUM CHLORIDE 9 MG/ML
3 INJECTION INTRAMUSCULAR; INTRAVENOUS; SUBCUTANEOUS EVERY 8 HOURS
Refills: 0 | Status: DISCONTINUED | OUTPATIENT
Start: 2020-12-04 | End: 2020-12-05

## 2020-12-04 RX ADMIN — PIPERACILLIN AND TAZOBACTAM 25 GRAM(S): 4; .5 INJECTION, POWDER, LYOPHILIZED, FOR SOLUTION INTRAVENOUS at 06:42

## 2020-12-04 RX ADMIN — PIPERACILLIN AND TAZOBACTAM 25 GRAM(S): 4; .5 INJECTION, POWDER, LYOPHILIZED, FOR SOLUTION INTRAVENOUS at 22:53

## 2020-12-04 RX ADMIN — SODIUM CHLORIDE 3 MILLILITER(S): 9 INJECTION INTRAMUSCULAR; INTRAVENOUS; SUBCUTANEOUS at 22:23

## 2020-12-04 RX ADMIN — ENOXAPARIN SODIUM 40 MILLIGRAM(S): 100 INJECTION SUBCUTANEOUS at 11:42

## 2020-12-04 RX ADMIN — Medication 1: at 18:46

## 2020-12-04 RX ADMIN — Medication 1: at 09:50

## 2020-12-04 RX ADMIN — SODIUM CHLORIDE 50 MILLILITER(S): 9 INJECTION INTRAMUSCULAR; INTRAVENOUS; SUBCUTANEOUS at 06:42

## 2020-12-04 RX ADMIN — PIPERACILLIN AND TAZOBACTAM 25 GRAM(S): 4; .5 INJECTION, POWDER, LYOPHILIZED, FOR SOLUTION INTRAVENOUS at 14:17

## 2020-12-04 NOTE — PROGRESS NOTE ADULT - ATTENDING COMMENTS
I saw and examined the pt and discussed the tx plan with the House Staff. I agree with the exam and plan as documented in the surgery resident's note from today with comments/changes below.  D/w Dr Cotton yesterday.  Await LFTs, if no significant down trending, advanced GI to decide on EUS/ERCP.  Yesica Conte MD

## 2020-12-04 NOTE — PROGRESS NOTE ADULT - ASSESSMENT
60 y.o M with PMHx of DM2, HLD with intermittent N/V associated with bilateral lower quadrant pain, fever, elevated transaminases, total bili 3.9 and cholelithiasis, acute cholecystitis on CT A/P. MRCP 12/1 negative for choledocholithiasis Now s/p percutaneous cholecystostomy.     Plan:  - NPO after MN.  - Pain control  - IVF (NS for hyponatremia)  - IV Zosyn  - Trending LFTs.   - LVX for DVT ppx  - F/u AM labs  - Final plan to be discussed with attending    Green Surgery [phone]   60 y.o M with PMHx of DM2, HLD with intermittent N/V associated with bilateral lower quadrant pain, fever, elevated transaminases, total bili 3.9 and cholelithiasis, acute cholecystitis on CT A/P. MRCP 12/1 negative for choledocholithiasis Now s/p percutaneous cholecystostomy.     Plan:  - NPO after MN eventually for EUS, awaiting for AM LFTs results.  - Pain control  - IVF (NS for hyponatremia)  - IV Zosyn  - Trending LFTs.   - LVX for DVT ppx  - F/u AM labs  - Final plan to be discussed with attending    Green Surgery [phone]

## 2020-12-04 NOTE — PRE PROCEDURE NOTE - PRE PROCEDURE EVALUATION
Attending Physician: Dr. Robles                            Procedure: EUS +/- ERCP    Indication for Procedure:  _elevated LFTs_____  PAST MEDICAL & SURGICAL HISTORY:  Hypercholesterolemia    Diabetes mellitus    No significant past surgical history      ALLERGIES:  No Known Allergies    HOME MEDICATIONS:  atorvastatin 40 mg oral tablet: 1 tab(s) orally once a day  glimepiride 2 mg oral tablet: 1.5 tab(s) orally once a day with breakfast  metFORMIN 1000 mg oral tablet: 1 tab(s) orally 2 times a day    AICD/PPM: [ ] yes   [X] no    PERTINENT LAB DATA:                        14.5   6.97  )-----------( 245      ( 04 Dec 2020 06:50 )             41.8     12-04    137  |  99  |  8   ----------------------------<  187<H>  3.8   |  24  |  0.88    Ca    9.4      04 Dec 2020 06:50  Phos  3.4     12-04  Mg     2.3     12-04    TPro  7.2  /  Alb  3.9  /  TBili  3.0<H>  /  DBili  x   /  AST  109<H>  /  ALT  174<H>  /  AlkPhos  481<H>  12-04                PHYSICAL EXAMINATION:    T(C): 36.6  HR: 62  BP: 116/74  RR: 17  SpO2: 97%    Constitutional: NAD  HEENT: PERRLA, EOMI,    Neck:  No JVD  Respiratory: CTAB/L  Cardiovascular: S1 and S2  Gastrointestinal: BS+, soft, NT/ND  Extremities: No peripheral edema  Neurological: A/O x 3, no focal deficits  Psychiatric: Normal mood, normal affect  Skin: No rashes    ASA Class: I [ ]  II [X]  III [ ]  IV [ ]    COMMENTS:    The patient is a suitable candidate for the planned procedure unless box checked [ ]  No, explain:

## 2020-12-04 NOTE — CHART NOTE - NSCHARTNOTEFT_GEN_A_CORE
S/p EGD and EUS. Notable for gastritis that was biopsied. No stones/sludge or CBD dilation noted on EUS. No obvious biliary obstruction noted on EUS. No explanation for elevated liver enzymes.    Full procedure note to follow

## 2020-12-04 NOTE — PROGRESS NOTE ADULT - SUBJECTIVE AND OBJECTIVE BOX
No acute events reported over the past 24hrs.    Patient seen and examined at bedside. Feeling well. Pain is well controlled.Tolerating diet without  nausea or vomiting. + Flatus, + BM  Denies fever, chills, CP, or SOB.    Physical Examination:  GEN: NAD, resting quietly. AAoX4  No scleral jaundice.   PULM: symmetric chest rise bilaterally, no increased WOB  ABD: soft, nontender, nondistended, drain w/ bilious output, draining  EXTR: no LE erythema, moving all extremities      Vital Signs Last 24 Hrs  T(C): 36.9 (04 Dec 2020 01:30), Max: 36.9 (04 Dec 2020 01:30)  T(F): 98.4 (04 Dec 2020 01:30), Max: 98.4 (04 Dec 2020 01:30)  HR: 65 (04 Dec 2020 01:30) (65 - 71)  BP: 150/79 (04 Dec 2020 01:30) (115/77 - 150/79)  BP(mean): --  RR: 18 (04 Dec 2020 01:30) (18 - 18)  SpO2: 98% (04 Dec 2020 01:30) (97% - 100%)    I&O's Detail    02 Dec 2020 07:01  -  03 Dec 2020 07:00  --------------------------------------------------------  IN:    dextrose 5% + sodium chloride 0.9% w/ Additives: 2400 mL    IV PiggyBack: 300 mL    Oral Fluid: 640 mL  Total IN: 3340 mL    OUT:    T-Tube (mL): 30 mL    Voided (mL): 2800 mL  Total OUT: 2830 mL    Total NET: 510 mL      03 Dec 2020 07:01  -  04 Dec 2020 04:42  --------------------------------------------------------  IN:    dextrose 5% + sodium chloride 0.9% w/ Additives: 200 mL    IV PiggyBack: 500 mL    IV PiggyBack: 200 mL    Oral Fluid: 660 mL    sodium chloride 0.9%: 500 mL  Total IN: 2060 mL    OUT:    T-Tube (mL): 80 mL    Voided (mL): 5050 mL  Total OUT: 5130 mL    Total NET: -3070 mL          12-03    132<L>  |  98  |  6<L>  ----------------------------<  240<H>  4.3   |  24  |  0.86    Ca    9.0      03 Dec 2020 06:53  Phos  2.7     12-03  Mg     2.1     12-03    TPro  6.1  /  Alb  3.0<L>  /  TBili  2.5<H>  /  DBili  1.5<H>  /  AST  55<H>  /  ALT  125<H>  /  AlkPhos  360<H>  12-03                            12.8   4.70  )-----------( 180      ( 03 Dec 2020 06:53 )             37.2       PT/INR - ( 02 Dec 2020 09:21 )   PT: 13.4 sec;   INR: 1.14 ratio         PTT - ( 02 Dec 2020 09:21 )  PTT:31.5 sec    LABS:                         12.8   4.70  )-----------( 180      ( 03 Dec 2020 06:53 )             37.2     12-03    132<L>  |  98  |  6<L>  ----------------------------<  240<H>  4.3   |  24  |  0.86    Ca    9.0      03 Dec 2020 06:53  Phos  2.7     12-03  Mg     2.1     12-03    TPro  6.1  /  Alb  3.0<L>  /  TBili  2.5<H>  /  DBili  1.5<H>  /  AST  55<H>  /  ALT  125<H>  /  AlkPhos  360<H>  12-03    PT/INR - ( 02 Dec 2020 09:21 )   PT: 13.4 sec;   INR: 1.14 ratio         PTT - ( 02 Dec 2020 09:21 )  PTT:31.5 sec      MEDICATIONS  (STANDING):  dextrose 40% Gel 15 Gram(s) Oral once  dextrose 5%. 1000 milliLiter(s) (100 mL/Hr) IV Continuous <Continuous>  dextrose 5%. 1000 milliLiter(s) (50 mL/Hr) IV Continuous <Continuous>  dextrose 50% Injectable 25 Gram(s) IV Push once  dextrose 50% Injectable 12.5 Gram(s) IV Push once  dextrose 50% Injectable 25 Gram(s) IV Push once  enoxaparin Injectable 40 milliGRAM(s) SubCutaneous daily  glucagon  Injectable 1 milliGRAM(s) IntraMuscular once  insulin lispro (ADMELOG) corrective regimen sliding scale   SubCutaneous three times a day before meals  insulin lispro (ADMELOG) corrective regimen sliding scale   SubCutaneous at bedtime  pantoprazole    Tablet 40 milliGRAM(s) Oral before breakfast  piperacillin/tazobactam IVPB.. 3.375 Gram(s) IV Intermittent every 8 hours  sodium chloride 0.9%. 1000 milliLiter(s) (50 mL/Hr) IV Continuous <Continuous>    MEDICATIONS  (PRN):  acetaminophen   Tablet .. 1000 milliGRAM(s) Oral every 6 hours PRN Temp greater or equal to 38C (100.4F), Mild Pain (1 - 3), Moderate Pain (4 - 6)  oxyCODONE    IR 5 milliGRAM(s) Oral every 4 hours PRN Moderate Pain (4 - 6)

## 2020-12-05 ENCOUNTER — TRANSCRIPTION ENCOUNTER (OUTPATIENT)
Age: 60
End: 2020-12-05

## 2020-12-05 VITALS
OXYGEN SATURATION: 100 % | SYSTOLIC BLOOD PRESSURE: 128 MMHG | DIASTOLIC BLOOD PRESSURE: 75 MMHG | TEMPERATURE: 98 F | HEART RATE: 84 BPM | RESPIRATION RATE: 18 BRPM

## 2020-12-05 LAB
-  AMIKACIN: SIGNIFICANT CHANGE UP
-  AMOXICILLIN/CLAVULANIC ACID: SIGNIFICANT CHANGE UP
-  AMPICILLIN/SULBACTAM: SIGNIFICANT CHANGE UP
-  AMPICILLIN: SIGNIFICANT CHANGE UP
-  AZTREONAM: SIGNIFICANT CHANGE UP
-  CEFAZOLIN: SIGNIFICANT CHANGE UP
-  CEFEPIME: SIGNIFICANT CHANGE UP
-  CEFOXITIN: SIGNIFICANT CHANGE UP
-  CEFTRIAXONE: SIGNIFICANT CHANGE UP
-  CIPROFLOXACIN: SIGNIFICANT CHANGE UP
-  ERTAPENEM: SIGNIFICANT CHANGE UP
-  GENTAMICIN: SIGNIFICANT CHANGE UP
-  IMIPENEM: SIGNIFICANT CHANGE UP
-  LEVOFLOXACIN: SIGNIFICANT CHANGE UP
-  MEROPENEM: SIGNIFICANT CHANGE UP
-  PIPERACILLIN/TAZOBACTAM: SIGNIFICANT CHANGE UP
-  TOBRAMYCIN: SIGNIFICANT CHANGE UP
-  TRIMETHOPRIM/SULFAMETHOXAZOLE: SIGNIFICANT CHANGE UP
ALBUMIN SERPL ELPH-MCNC: 3.2 G/DL — LOW (ref 3.3–5)
ALP SERPL-CCNC: 496 U/L — HIGH (ref 40–120)
ALT FLD-CCNC: 172 U/L — HIGH (ref 10–45)
ANION GAP SERPL CALC-SCNC: 15 MMOL/L — SIGNIFICANT CHANGE UP (ref 5–17)
AST SERPL-CCNC: 107 U/L — HIGH (ref 10–40)
BILIRUB SERPL-MCNC: 2.4 MG/DL — HIGH (ref 0.2–1.2)
BUN SERPL-MCNC: 8 MG/DL — SIGNIFICANT CHANGE UP (ref 7–23)
CALCIUM SERPL-MCNC: 9.2 MG/DL — SIGNIFICANT CHANGE UP (ref 8.4–10.5)
CHLORIDE SERPL-SCNC: 101 MMOL/L — SIGNIFICANT CHANGE UP (ref 96–108)
CO2 SERPL-SCNC: 22 MMOL/L — SIGNIFICANT CHANGE UP (ref 22–31)
CREAT SERPL-MCNC: 0.75 MG/DL — SIGNIFICANT CHANGE UP (ref 0.5–1.3)
GLUCOSE BLDC GLUCOMTR-MCNC: 198 MG/DL — HIGH (ref 70–99)
GLUCOSE BLDC GLUCOMTR-MCNC: 376 MG/DL — HIGH (ref 70–99)
GLUCOSE SERPL-MCNC: 181 MG/DL — HIGH (ref 70–99)
HCT VFR BLD CALC: 39.4 % — SIGNIFICANT CHANGE UP (ref 39–50)
HGB BLD-MCNC: 13.2 G/DL — SIGNIFICANT CHANGE UP (ref 13–17)
MAGNESIUM SERPL-MCNC: 2.4 MG/DL — SIGNIFICANT CHANGE UP (ref 1.6–2.6)
MCHC RBC-ENTMCNC: 28.6 PG — SIGNIFICANT CHANGE UP (ref 27–34)
MCHC RBC-ENTMCNC: 33.5 GM/DL — SIGNIFICANT CHANGE UP (ref 32–36)
MCV RBC AUTO: 85.3 FL — SIGNIFICANT CHANGE UP (ref 80–100)
METHOD TYPE: SIGNIFICANT CHANGE UP
NRBC # BLD: 0 /100 WBCS — SIGNIFICANT CHANGE UP (ref 0–0)
PHOSPHATE SERPL-MCNC: 3.4 MG/DL — SIGNIFICANT CHANGE UP (ref 2.5–4.5)
PLATELET # BLD AUTO: 256 K/UL — SIGNIFICANT CHANGE UP (ref 150–400)
POTASSIUM SERPL-MCNC: 4.1 MMOL/L — SIGNIFICANT CHANGE UP (ref 3.5–5.3)
POTASSIUM SERPL-SCNC: 4.1 MMOL/L — SIGNIFICANT CHANGE UP (ref 3.5–5.3)
PROT SERPL-MCNC: 6.6 G/DL — SIGNIFICANT CHANGE UP (ref 6–8.3)
RBC # BLD: 4.62 M/UL — SIGNIFICANT CHANGE UP (ref 4.2–5.8)
RBC # FLD: 14 % — SIGNIFICANT CHANGE UP (ref 10.3–14.5)
SODIUM SERPL-SCNC: 138 MMOL/L — SIGNIFICANT CHANGE UP (ref 135–145)
WBC # BLD: 7.01 K/UL — SIGNIFICANT CHANGE UP (ref 3.8–10.5)
WBC # FLD AUTO: 7.01 K/UL — SIGNIFICANT CHANGE UP (ref 3.8–10.5)

## 2020-12-05 PROCEDURE — 88305 TISSUE EXAM BY PATHOLOGIST: CPT

## 2020-12-05 PROCEDURE — 96361 HYDRATE IV INFUSION ADD-ON: CPT | Mod: XU

## 2020-12-05 PROCEDURE — 83690 ASSAY OF LIPASE: CPT

## 2020-12-05 PROCEDURE — 87075 CULTR BACTERIA EXCEPT BLOOD: CPT

## 2020-12-05 PROCEDURE — 87186 SC STD MICRODIL/AGAR DIL: CPT

## 2020-12-05 PROCEDURE — 85610 PROTHROMBIN TIME: CPT

## 2020-12-05 PROCEDURE — 84132 ASSAY OF SERUM POTASSIUM: CPT

## 2020-12-05 PROCEDURE — 84100 ASSAY OF PHOSPHORUS: CPT

## 2020-12-05 PROCEDURE — 80048 BASIC METABOLIC PNL TOTAL CA: CPT

## 2020-12-05 PROCEDURE — 82803 BLOOD GASES ANY COMBINATION: CPT

## 2020-12-05 PROCEDURE — 86900 BLOOD TYPING SEROLOGIC ABO: CPT

## 2020-12-05 PROCEDURE — 85018 HEMOGLOBIN: CPT

## 2020-12-05 PROCEDURE — 85027 COMPLETE CBC AUTOMATED: CPT

## 2020-12-05 PROCEDURE — 86901 BLOOD TYPING SEROLOGIC RH(D): CPT

## 2020-12-05 PROCEDURE — 82435 ASSAY OF BLOOD CHLORIDE: CPT

## 2020-12-05 PROCEDURE — 86850 RBC ANTIBODY SCREEN: CPT

## 2020-12-05 PROCEDURE — 87205 SMEAR GRAM STAIN: CPT

## 2020-12-05 PROCEDURE — C1769: CPT

## 2020-12-05 PROCEDURE — 81001 URINALYSIS AUTO W/SCOPE: CPT

## 2020-12-05 PROCEDURE — 71045 X-RAY EXAM CHEST 1 VIEW: CPT

## 2020-12-05 PROCEDURE — U0003: CPT

## 2020-12-05 PROCEDURE — 83735 ASSAY OF MAGNESIUM: CPT

## 2020-12-05 PROCEDURE — 87086 URINE CULTURE/COLONY COUNT: CPT

## 2020-12-05 PROCEDURE — 82947 ASSAY GLUCOSE BLOOD QUANT: CPT

## 2020-12-05 PROCEDURE — 80076 HEPATIC FUNCTION PANEL: CPT

## 2020-12-05 PROCEDURE — 74181 MRI ABDOMEN W/O CONTRAST: CPT

## 2020-12-05 PROCEDURE — 87070 CULTURE OTHR SPECIMN AEROBIC: CPT

## 2020-12-05 PROCEDURE — 99285 EMERGENCY DEPT VISIT HI MDM: CPT | Mod: 25

## 2020-12-05 PROCEDURE — 96374 THER/PROPH/DIAG INJ IV PUSH: CPT | Mod: XU

## 2020-12-05 PROCEDURE — 85730 THROMBOPLASTIN TIME PARTIAL: CPT

## 2020-12-05 PROCEDURE — 85025 COMPLETE CBC W/AUTO DIFF WBC: CPT

## 2020-12-05 PROCEDURE — 82962 GLUCOSE BLOOD TEST: CPT

## 2020-12-05 PROCEDURE — 80053 COMPREHEN METABOLIC PANEL: CPT

## 2020-12-05 PROCEDURE — 88312 SPECIAL STAINS GROUP 1: CPT

## 2020-12-05 PROCEDURE — 47490 INCISION OF GALLBLADDER: CPT

## 2020-12-05 PROCEDURE — 83036 HEMOGLOBIN GLYCOSYLATED A1C: CPT

## 2020-12-05 PROCEDURE — 85014 HEMATOCRIT: CPT

## 2020-12-05 PROCEDURE — 82330 ASSAY OF CALCIUM: CPT

## 2020-12-05 PROCEDURE — C1729: CPT

## 2020-12-05 PROCEDURE — 83605 ASSAY OF LACTIC ACID: CPT

## 2020-12-05 PROCEDURE — 84295 ASSAY OF SERUM SODIUM: CPT

## 2020-12-05 PROCEDURE — C9399: CPT

## 2020-12-05 PROCEDURE — 74177 CT ABD & PELVIS W/CONTRAST: CPT

## 2020-12-05 RX ORDER — ACETAMINOPHEN 500 MG
2 TABLET ORAL
Qty: 40 | Refills: 0
Start: 2020-12-05 | End: 2020-12-09

## 2020-12-05 RX ORDER — OXYCODONE HYDROCHLORIDE 5 MG/1
1 TABLET ORAL
Qty: 8 | Refills: 0
Start: 2020-12-05 | End: 2020-12-06

## 2020-12-05 RX ADMIN — SODIUM CHLORIDE 3 MILLILITER(S): 9 INJECTION INTRAMUSCULAR; INTRAVENOUS; SUBCUTANEOUS at 09:14

## 2020-12-05 RX ADMIN — SODIUM CHLORIDE 3 MILLILITER(S): 9 INJECTION INTRAMUSCULAR; INTRAVENOUS; SUBCUTANEOUS at 13:01

## 2020-12-05 RX ADMIN — ENOXAPARIN SODIUM 40 MILLIGRAM(S): 100 INJECTION SUBCUTANEOUS at 13:00

## 2020-12-05 RX ADMIN — Medication 5: at 13:00

## 2020-12-05 RX ADMIN — Medication 1: at 09:14

## 2020-12-05 RX ADMIN — PIPERACILLIN AND TAZOBACTAM 25 GRAM(S): 4; .5 INJECTION, POWDER, LYOPHILIZED, FOR SOLUTION INTRAVENOUS at 06:30

## 2020-12-05 RX ADMIN — PANTOPRAZOLE SODIUM 40 MILLIGRAM(S): 20 TABLET, DELAYED RELEASE ORAL at 06:29

## 2020-12-05 NOTE — CHART NOTE - NSCHARTNOTEFT_GEN_A_CORE
GENERAL SURGERY POST-PROCEDURE NOTE    JONATHAN ECHOLS | [id number] | [facility] 2MON 214 D1    Procedure: s/p EGD and EUS    SUBJECTIVE: Patient seen after procedure. Patient tolerated procedure well and recovering on surgical floor without acute complaints.   SOB:  [ ] YES [ x] NO  Chest Discomfort: [ ] YES [x ] NO    Nausea: [ ] YES [x ] NO           Vomiting: [ ] YES [x ] NO  Diarrhea: [ ] YES [x ] NO         Void: [ ]YES [x ]No           Pain Control Adequate: [x ] YES [ ] NO    PAST MEDICAL & SURGICAL HISTORY:  Hypercholesterolemia    Diabetes mellitus    No significant past surgical history    Physical Examination:  GEN: NAD, resting quietly. AAoX4  No scleral jaundice.   PULM: symmetric chest rise bilaterally, no increased WOB  ABD: soft, nontender, nondistended, drain w/ bilious output, draining  EXTR: no LE erythema, moving all extremities    Vital Signs Last 24 Hrs  T(C): 36.6 (05 Dec 2020 01:28), Max: 37.1 (04 Dec 2020 18:18)  T(F): 97.8 (05 Dec 2020 01:28), Max: 98.8 (04 Dec 2020 18:18)  HR: 67 (05 Dec 2020 01:28) (62 - 83)  BP: 122/69 (05 Dec 2020 01:28) (105/67 - 149/85)  BP(mean): --  RR: 18 (05 Dec 2020 01:28) (12 - 30)  SpO2: 97% (05 Dec 2020 01:28) (95% - 99%)  I&O's Summary    03 Dec 2020 07:01  -  04 Dec 2020 07:00  --------------------------------------------------------  IN: 2760 mL / OUT: 5730 mL / NET: -2970 mL    04 Dec 2020 07:01  -  05 Dec 2020 02:24  --------------------------------------------------------  IN: 690 mL / OUT: 1900 mL / NET: -1210 mL      I&O's Detail    03 Dec 2020 07:01  -  04 Dec 2020 07:00  --------------------------------------------------------  IN:    dextrose 5% + sodium chloride 0.9% w/ Additives: 200 mL    IV PiggyBack: 500 mL    IV PiggyBack: 300 mL    Oral Fluid: 660 mL    sodium chloride 0.9%: 1100 mL  Total IN: 2760 mL    OUT:    T-Tube (mL): 80 mL    Voided (mL): 5650 mL  Total OUT: 5730 mL    Total NET: -2970 mL      04 Dec 2020 07:01  -  05 Dec 2020 02:24  --------------------------------------------------------  IN:    Oral Fluid: 440 mL    sodium chloride 0.9%: 250 mL  Total IN: 690 mL    OUT:    T-Tube (mL): 0 mL    Voided (mL): 1900 mL  Total OUT: 1900 mL    Total NET: -1210 mL                              14.5   6.97  )-----------( 245      ( 04 Dec 2020 06:50 )             41.8     12-04    137  |  99  |  8   ----------------------------<  187<H>  3.8   |  24  |  0.88    Ca    9.4      04 Dec 2020 06:50  Phos  3.4     12-04  Mg     2.3     12-04    TPro  7.2  /  Alb  3.9  /  TBili  3.0<H>  /  DBili  x   /  AST  109<H>  /  ALT  174<H>  /  AlkPhos  481<H>  12-04     Procedure Note  Notable for gastritis that was biopsied. No stones/sludge or CBD dilation noted on EUS. No obvious biliary obstruction noted on EUS. No explanation for elevated liver enzymes.      Assessment:  The patient is a 60y M who is now s/p EGD and EUS with above findings.     Plan:  - Pain control as needed  - Diet: Regular diet  - IV lock  - DVT ppx  - Encourage OOB and ambulating as tolerated  - F/U Labs  - IS    PGY 1 Covering Team

## 2020-12-05 NOTE — PROGRESS NOTE ADULT - SUBJECTIVE AND OBJECTIVE BOX
Interval events: s/p diag EUS.    Patient seen and examined at bedside. Feeling well. Pain is well controlled.Tolerating diet without  nausea or vomiting. + Flatus, + BM  Denies fever, chills, CP, or SOB.    Physical Examination:  GEN: NAD, resting quietly. AAoX4  No scleral jaundice.   PULM: symmetric chest rise bilaterally, no increased WOB  ABD: soft, nontender, nondistended, drain w/ bilious output, draining  EXTR: no LE erythema, moving all extremities      Vital Signs Last 24 Hrs  T(C): 36.6 (05 Dec 2020 01:28), Max: 37.1 (04 Dec 2020 18:18)  T(F): 97.8 (05 Dec 2020 01:28), Max: 98.8 (04 Dec 2020 18:18)  HR: 67 (05 Dec 2020 01:28) (62 - 83)  BP: 122/69 (05 Dec 2020 01:28) (105/67 - 149/85)  BP(mean): --  RR: 18 (05 Dec 2020 01:28) (12 - 30)  SpO2: 97% (05 Dec 2020 01:28) (95% - 99%)    I&O's Detail    03 Dec 2020 07:01  -  04 Dec 2020 07:00  --------------------------------------------------------  IN:    dextrose 5% + sodium chloride 0.9% w/ Additives: 200 mL    IV PiggyBack: 500 mL    IV PiggyBack: 300 mL    Oral Fluid: 660 mL    sodium chloride 0.9%: 1100 mL  Total IN: 2760 mL    OUT:    T-Tube (mL): 80 mL    Voided (mL): 5650 mL  Total OUT: 5730 mL    Total NET: -2970 mL      04 Dec 2020 07:01  -  05 Dec 2020 05:42  --------------------------------------------------------  IN:    IV PiggyBack: 100 mL    Oral Fluid: 840 mL    sodium chloride 0.9%: 250 mL  Total IN: 1190 mL    OUT:    T-Tube (mL): 30 mL    Voided (mL): 1900 mL  Total OUT: 1930 mL    Total NET: -740 mL          12-04    137  |  99  |  8   ----------------------------<  187<H>  3.8   |  24  |  0.88    Ca    9.4      04 Dec 2020 06:50  Phos  3.4     12-04  Mg     2.3     12-04    TPro  7.2  /  Alb  3.9  /  TBili  3.0<H>  /  DBili  x   /  AST  109<H>  /  ALT  174<H>  /  AlkPhos  481<H>  12-04                            14.5   6.97  )-----------( 245      ( 04 Dec 2020 06:50 )             41.8           LABS:                         14.5   6.97  )-----------( 245      ( 04 Dec 2020 06:50 )             41.8     12-04    137  |  99  |  8   ----------------------------<  187<H>  3.8   |  24  |  0.88    Ca    9.4      04 Dec 2020 06:50  Phos  3.4     12-04  Mg     2.3     12-04    TPro  7.2  /  Alb  3.9  /  TBili  3.0<H>  /  DBili  x   /  AST  109<H>  /  ALT  174<H>  /  AlkPhos  481<H>  12-04        MEDICATIONS  (STANDING):  dextrose 40% Gel 15 Gram(s) Oral once  dextrose 5%. 1000 milliLiter(s) (50 mL/Hr) IV Continuous <Continuous>  dextrose 5%. 1000 milliLiter(s) (100 mL/Hr) IV Continuous <Continuous>  dextrose 50% Injectable 25 Gram(s) IV Push once  dextrose 50% Injectable 12.5 Gram(s) IV Push once  dextrose 50% Injectable 25 Gram(s) IV Push once  enoxaparin Injectable 40 milliGRAM(s) SubCutaneous daily  glucagon  Injectable 1 milliGRAM(s) IntraMuscular once  insulin lispro (ADMELOG) corrective regimen sliding scale   SubCutaneous three times a day before meals  insulin lispro (ADMELOG) corrective regimen sliding scale   SubCutaneous at bedtime  pantoprazole    Tablet 40 milliGRAM(s) Oral before breakfast  piperacillin/tazobactam IVPB.. 3.375 Gram(s) IV Intermittent every 8 hours  sodium chloride 0.9% lock flush 3 milliLiter(s) IV Push every 8 hours    MEDICATIONS  (PRN):  acetaminophen   Tablet .. 1000 milliGRAM(s) Oral every 6 hours PRN Temp greater or equal to 38C (100.4F), Mild Pain (1 - 3), Moderate Pain (4 - 6)  oxyCODONE    IR 5 milliGRAM(s) Oral every 4 hours PRN Moderate Pain (4 - 6)

## 2020-12-05 NOTE — PROGRESS NOTE ADULT - ASSESSMENT
60 y.o M with PMHx of DM2, HLD with intermittent N/V associated with bilateral lower quadrant pain, fever, elevated transaminases, total bili 3.9 and cholelithiasis, acute cholecystitis on CT A/P. MRCP 12/1 negative for choledocholithiasis Now s/p percutaneous cholecystostomy. s/p EUS without overt obstructing pathology detected at CBD.      Plan:  - tolerating diet  - Pain control  - IVF (NS for hyponatremia)  - IV Zosyn  - Trending LFTs.   - LVX for DVT ppx  - F/u AM labs  - Final plan to be discussed with attending    Green Surgery [phone]

## 2020-12-05 NOTE — DISCHARGE NOTE NURSING/CASE MANAGEMENT/SOCIAL WORK - NSDCFUADDAPPT_GEN_ALL_CORE_FT
You had gastric wall thickening seen on imaging during your hospitalization.  You will need further evaluation with upper endoscopy is recommended as outpatient- You can follow up with [facility] Dr. Ngo in 2 weeks [phone]

## 2020-12-05 NOTE — DISCHARGE NOTE NURSING/CASE MANAGEMENT/SOCIAL WORK - NSSCTYPOFSERV_GEN_ALL_CORE
Skilled RN visits to reinforce IR drain teaching. RN will call day after discharge to schedule visit.

## 2020-12-05 NOTE — PROGRESS NOTE ADULT - ATTENDING COMMENTS
I saw and examined the patient, and reviewed  the history with the patient and   Agree with note which was also reviewed and edited where appropriate.  D/W patient, RN, residents and Fellow

## 2020-12-05 NOTE — DISCHARGE NOTE NURSING/CASE MANAGEMENT/SOCIAL WORK - PATIENT PORTAL LINK FT
You can access the FollowMyHealth Patient Portal offered by [facility] by registering at the following website: [email or web address]. By joining [facility]’s FollowMyHealth portal, you will also be able to view your health information using other applications (apps) compatible with our system.

## 2020-12-07 ENCOUNTER — NON-APPOINTMENT (OUTPATIENT)
Age: 60
End: 2020-12-07

## 2020-12-07 LAB
CULTURE RESULTS: SIGNIFICANT CHANGE UP
ORGANISM # SPEC MICROSCOPIC CNT: SIGNIFICANT CHANGE UP
SPECIMEN SOURCE: SIGNIFICANT CHANGE UP
SURGICAL PATHOLOGY STUDY: SIGNIFICANT CHANGE UP

## 2020-12-08 DIAGNOSIS — R10.9 UNSPECIFIED ABDOMINAL PAIN: ICD-10-CM

## 2020-12-14 DIAGNOSIS — R79.89 OTHER SPECIFIED ABNORMAL FINDINGS OF BLOOD CHEMISTRY: ICD-10-CM

## 2020-12-14 PROBLEM — E78.00 PURE HYPERCHOLESTEROLEMIA, UNSPECIFIED: Chronic | Status: ACTIVE | Noted: 2020-11-30

## 2020-12-14 PROBLEM — E11.9 TYPE 2 DIABETES MELLITUS WITHOUT COMPLICATIONS: Chronic | Status: ACTIVE | Noted: 2020-11-30

## 2020-12-16 ENCOUNTER — APPOINTMENT (OUTPATIENT)
Dept: SURGERY | Facility: CLINIC | Age: 60
End: 2020-12-16
Payer: MEDICAID

## 2020-12-16 ENCOUNTER — OUTPATIENT (OUTPATIENT)
Dept: OUTPATIENT SERVICES | Facility: HOSPITAL | Age: 60
LOS: 1 days | End: 2020-12-16
Payer: MEDICAID

## 2020-12-16 VITALS
RESPIRATION RATE: 18 BRPM | SYSTOLIC BLOOD PRESSURE: 145 MMHG | TEMPERATURE: 98.2 F | WEIGHT: 195 LBS | DIASTOLIC BLOOD PRESSURE: 66 MMHG | BODY MASS INDEX: 27.92 KG/M2 | HEIGHT: 70 IN | OXYGEN SATURATION: 98 % | HEART RATE: 89 BPM

## 2020-12-16 DIAGNOSIS — Z11.59 ENCOUNTER FOR SCREENING FOR OTHER VIRAL DISEASES: ICD-10-CM

## 2020-12-16 LAB
ALBUMIN SERPL ELPH-MCNC: 4.6 G/DL
ALP BLD-CCNC: 311 U/L
ALT SERPL-CCNC: 71 U/L
ANION GAP SERPL CALC-SCNC: 13 MMOL/L
AST SERPL-CCNC: 33 U/L
BILIRUB DIRECT SERPL-MCNC: 0.5 MG/DL
BILIRUB INDIRECT SERPL-MCNC: 0.6 MG/DL
BILIRUB SERPL-MCNC: 1.1 MG/DL
BUN SERPL-MCNC: 12 MG/DL
CALCIUM SERPL-MCNC: 9.6 MG/DL
CHLORIDE SERPL-SCNC: 101 MMOL/L
CO2 SERPL-SCNC: 25 MMOL/L
CREAT SERPL-MCNC: 0.99 MG/DL
GLUCOSE SERPL-MCNC: 121 MG/DL
POTASSIUM SERPL-SCNC: 4.7 MMOL/L
PROT SERPL-MCNC: 7.1 G/DL
SARS-COV-2 RNA SPEC QL NAA+PROBE: SIGNIFICANT CHANGE UP
SODIUM SERPL-SCNC: 138 MMOL/L

## 2020-12-16 PROCEDURE — 99072 ADDL SUPL MATRL&STAF TM PHE: CPT

## 2020-12-16 PROCEDURE — 99214 OFFICE O/P EST MOD 30 MIN: CPT

## 2020-12-16 PROCEDURE — U0003: CPT

## 2020-12-16 NOTE — ASSESSMENT
[FreeTextEntry1] : 61 yo male with recent episode of acute cholecystitis treated with perc shukri tube. Currently doing well. \par I recommended interval laparoscopic cholecystectomy, possible open, and discussed the details, risks, benefits and alternatives of the procedure and recovery expectations with the patient. Planning for January. \par I will obtain a perc shukri tube study by IR which hopefully it will also image the biliary tree to rule out common bile duct stones or sludge.  We will request the study expeditiously as the patient has trouble flushing the tube.  Plan to repeat the LFTs at the time of PST, if they remain elevated and the tube study does not visualize the biliary tree, I will consider intraoperative cholangiogram versus repeat MRCP.

## 2020-12-16 NOTE — REASON FOR VISIT
[Post Hospitalization] : a post hospitalization visit [FreeTextEntry1] : Acute cholecystitis with cholelithiasis

## 2020-12-16 NOTE — HISTORY OF PRESENT ILLNESS
[de-identified] : Miguel is a  60 year old male here for cholecystitis with cholelithiasis. He was admitted to [facility] ED on 11/30/20 with  abdominal pain and nausea, vomiting for 10 days.  In \par the ED he was febrile to 103, mildly tachycardic to 105. LFTs elevated. CT demonstrated cholelithiasis and cholecystitis without CBD dilation. MRCP was negative for CBD stone. It showed sludge and GB wall thickening.  The patient's LFTs remained elevated and therefore his cholecystitis was treated with a percutaneous cholecystostomy tube by IR on 12/2.  His LFTs actually up-trended despite the perc shukri and he underwent an endoscopic ultrasound by GI which did not show common bile duct stones.\par Today patient denies any pain, nausea, vomiting or fever , chills. Reports having daily BM's. He was unable to flush the shukri tube since Monday because he cannot unlock the tube.  Reports drainage output 150 cc/day.\par Oupt LFTs with downtrending total bilirubin 1.1 from 4, alk phos 311 from 400's.\par

## 2020-12-24 PROBLEM — K81.9 CHOLECYSTITIS, UNSPECIFIED: Chronic | Status: ACTIVE | Noted: 2020-12-18

## 2020-12-28 ENCOUNTER — RX RENEWAL (OUTPATIENT)
Age: 60
End: 2020-12-28

## 2020-12-30 ENCOUNTER — OUTPATIENT (OUTPATIENT)
Dept: OUTPATIENT SERVICES | Facility: HOSPITAL | Age: 60
LOS: 1 days | End: 2020-12-30
Payer: MEDICAID

## 2020-12-30 VITALS
DIASTOLIC BLOOD PRESSURE: 81 MMHG | TEMPERATURE: 99 F | OXYGEN SATURATION: 96 % | RESPIRATION RATE: 18 BRPM | SYSTOLIC BLOOD PRESSURE: 121 MMHG | WEIGHT: 173.06 LBS | HEART RATE: 90 BPM | HEIGHT: 71 IN

## 2020-12-30 DIAGNOSIS — K81.9 CHOLECYSTITIS, UNSPECIFIED: ICD-10-CM

## 2020-12-30 DIAGNOSIS — Z01.818 ENCOUNTER FOR OTHER PREPROCEDURAL EXAMINATION: ICD-10-CM

## 2020-12-30 DIAGNOSIS — K80.10 CALCULUS OF GALLBLADDER WITH CHRONIC CHOLECYSTITIS WITHOUT OBSTRUCTION: ICD-10-CM

## 2020-12-30 LAB
ALBUMIN SERPL ELPH-MCNC: 4.8 G/DL — SIGNIFICANT CHANGE UP (ref 3.3–5)
ALP SERPL-CCNC: 185 U/L — HIGH (ref 40–120)
ALT FLD-CCNC: 31 U/L — SIGNIFICANT CHANGE UP (ref 10–45)
ANION GAP SERPL CALC-SCNC: 14 MMOL/L — SIGNIFICANT CHANGE UP (ref 5–17)
AST SERPL-CCNC: 23 U/L — SIGNIFICANT CHANGE UP (ref 10–40)
BILIRUB SERPL-MCNC: 1.1 MG/DL — SIGNIFICANT CHANGE UP (ref 0.2–1.2)
BUN SERPL-MCNC: 10 MG/DL — SIGNIFICANT CHANGE UP (ref 7–23)
CALCIUM SERPL-MCNC: 9.9 MG/DL — SIGNIFICANT CHANGE UP (ref 8.4–10.5)
CHLORIDE SERPL-SCNC: 101 MMOL/L — SIGNIFICANT CHANGE UP (ref 96–108)
CO2 SERPL-SCNC: 24 MMOL/L — SIGNIFICANT CHANGE UP (ref 22–31)
CREAT SERPL-MCNC: 0.79 MG/DL — SIGNIFICANT CHANGE UP (ref 0.5–1.3)
GLUCOSE SERPL-MCNC: 84 MG/DL — SIGNIFICANT CHANGE UP (ref 70–99)
HCT VFR BLD CALC: 42.2 % — SIGNIFICANT CHANGE UP (ref 39–50)
HGB BLD-MCNC: 14.1 G/DL — SIGNIFICANT CHANGE UP (ref 13–17)
MCHC RBC-ENTMCNC: 28.9 PG — SIGNIFICANT CHANGE UP (ref 27–34)
MCHC RBC-ENTMCNC: 33.4 GM/DL — SIGNIFICANT CHANGE UP (ref 32–36)
MCV RBC AUTO: 86.5 FL — SIGNIFICANT CHANGE UP (ref 80–100)
NRBC # BLD: 0 /100 WBCS — SIGNIFICANT CHANGE UP (ref 0–0)
PLATELET # BLD AUTO: 168 K/UL — SIGNIFICANT CHANGE UP (ref 150–400)
POTASSIUM SERPL-MCNC: 4 MMOL/L — SIGNIFICANT CHANGE UP (ref 3.5–5.3)
POTASSIUM SERPL-SCNC: 4 MMOL/L — SIGNIFICANT CHANGE UP (ref 3.5–5.3)
PROT SERPL-MCNC: 7.7 G/DL — SIGNIFICANT CHANGE UP (ref 6–8.3)
RBC # BLD: 4.88 M/UL — SIGNIFICANT CHANGE UP (ref 4.2–5.8)
RBC # FLD: 13.5 % — SIGNIFICANT CHANGE UP (ref 10.3–14.5)
SODIUM SERPL-SCNC: 139 MMOL/L — SIGNIFICANT CHANGE UP (ref 135–145)
WBC # BLD: 8.63 K/UL — SIGNIFICANT CHANGE UP (ref 3.8–10.5)
WBC # FLD AUTO: 8.63 K/UL — SIGNIFICANT CHANGE UP (ref 3.8–10.5)

## 2020-12-30 PROCEDURE — 80053 COMPREHEN METABOLIC PANEL: CPT

## 2020-12-30 PROCEDURE — G0463: CPT

## 2020-12-30 PROCEDURE — 83036 HEMOGLOBIN GLYCOSYLATED A1C: CPT

## 2020-12-30 PROCEDURE — 85027 COMPLETE CBC AUTOMATED: CPT

## 2020-12-30 RX ORDER — CEFAZOLIN SODIUM 1 G
2000 VIAL (EA) INJECTION ONCE
Refills: 0 | Status: DISCONTINUED | OUTPATIENT
Start: 2021-01-07 | End: 2021-01-21

## 2020-12-30 NOTE — H&P PST ADULT - NSICDXPROBLEM_GEN_ALL_CORE_FT
PROBLEM DIAGNOSES  Problem: Cholecystitis  Assessment and Plan: Laparoscopic cholecystectomy, possible open, possible intraoperative cholangiogram

## 2020-12-30 NOTE — H&P PST ADULT - HISTORY OF PRESENT ILLNESS
This is a 59 y/o male PMH presented to PCP with c/o nausea and vomiting, was told to go to the ER 12/06/2020 to R/O appendicitis and found to have cholelithiasis, S/P biliary drain placement.  Presents today for laparoscopic cholecystectomy, possible open, possible intraoperative cholangiogram. This is a 59 y/o male PMH presented to PCP with c/o nausea and vomiting, was told to go to the ER 12/06/2020 to R/O appendicitis and found to have cholecystitis, S/P percutaneous cholecystotomy tube placement.  Presents today for laparoscopic cholecystectomy, possible open, possible intraoperative cholangiogram.

## 2020-12-31 ENCOUNTER — RX RENEWAL (OUTPATIENT)
Age: 60
End: 2020-12-31

## 2020-12-31 LAB
A1C WITH ESTIMATED AVERAGE GLUCOSE RESULT: 6.6 % — HIGH (ref 4–5.6)
ESTIMATED AVERAGE GLUCOSE: 143 MG/DL — HIGH (ref 68–114)

## 2021-01-04 ENCOUNTER — OUTPATIENT (OUTPATIENT)
Dept: OUTPATIENT SERVICES | Facility: HOSPITAL | Age: 61
LOS: 1 days | End: 2021-01-04
Payer: MEDICAID

## 2021-01-04 DIAGNOSIS — Z20.828 CONTACT WITH AND (SUSPECTED) EXPOSURE TO OTHER VIRAL COMMUNICABLE DISEASES: ICD-10-CM

## 2021-01-04 PROCEDURE — C9803: CPT

## 2021-01-04 PROCEDURE — U0005: CPT

## 2021-01-04 PROCEDURE — U0003: CPT

## 2021-01-05 ENCOUNTER — OUTPATIENT (OUTPATIENT)
Dept: OUTPATIENT SERVICES | Facility: HOSPITAL | Age: 61
LOS: 1 days | End: 2021-01-05
Payer: MEDICAID

## 2021-01-05 ENCOUNTER — NON-APPOINTMENT (OUTPATIENT)
Age: 61
End: 2021-01-05

## 2021-01-05 ENCOUNTER — APPOINTMENT (OUTPATIENT)
Dept: INTERNAL MEDICINE | Facility: CLINIC | Age: 61
End: 2021-01-05
Payer: MEDICAID

## 2021-01-05 VITALS
OXYGEN SATURATION: 97 % | WEIGHT: 175 LBS | DIASTOLIC BLOOD PRESSURE: 70 MMHG | HEIGHT: 70 IN | BODY MASS INDEX: 25.05 KG/M2 | HEART RATE: 85 BPM | SYSTOLIC BLOOD PRESSURE: 140 MMHG

## 2021-01-05 DIAGNOSIS — Z01.818 ENCOUNTER FOR OTHER PREPROCEDURAL EXAMINATION: ICD-10-CM

## 2021-01-05 DIAGNOSIS — I10 ESSENTIAL (PRIMARY) HYPERTENSION: ICD-10-CM

## 2021-01-05 DIAGNOSIS — E11.9 TYPE 2 DIABETES MELLITUS WITHOUT COMPLICATIONS: ICD-10-CM

## 2021-01-05 LAB — SARS-COV-2 RNA SPEC QL NAA+PROBE: SIGNIFICANT CHANGE UP

## 2021-01-05 PROCEDURE — G0463: CPT | Mod: 25

## 2021-01-05 PROCEDURE — 93005 ELECTROCARDIOGRAM TRACING: CPT

## 2021-01-05 PROCEDURE — 99213 OFFICE O/P EST LOW 20 MIN: CPT | Mod: GE

## 2021-01-05 NOTE — PHYSICAL EXAM
[No Acute Distress] : no acute distress [Well Nourished] : well nourished [PERRL] : pupils equal round and reactive to light [Normal Outer Ear/Nose] : the outer ears and nose were normal in appearance [Supple] : supple [No Respiratory Distress] : no respiratory distress  [No Accessory Muscle Use] : no accessory muscle use [Clear to Auscultation] : lungs were clear to auscultation bilaterally [Normal Rate] : normal rate  [Regular Rhythm] : with a regular rhythm [Normal S1, S2] : normal S1 and S2 [No Edema] : there was no peripheral edema [Soft] : abdomen soft [Non Tender] : non-tender [Non-distended] : non-distended [No Spinal Tenderness] : no spinal tenderness [No Rash] : no rash [Normal Gait] : normal gait [Normal Affect] : the affect was normal

## 2021-01-06 ENCOUNTER — OUTPATIENT (OUTPATIENT)
Dept: OUTPATIENT SERVICES | Facility: HOSPITAL | Age: 61
LOS: 1 days | End: 2021-01-06
Payer: MEDICAID

## 2021-01-06 ENCOUNTER — TRANSCRIPTION ENCOUNTER (OUTPATIENT)
Age: 61
End: 2021-01-06

## 2021-01-06 ENCOUNTER — RESULT REVIEW (OUTPATIENT)
Age: 61
End: 2021-01-06

## 2021-01-06 VITALS
DIASTOLIC BLOOD PRESSURE: 74 MMHG | RESPIRATION RATE: 16 BRPM | TEMPERATURE: 98 F | SYSTOLIC BLOOD PRESSURE: 133 MMHG | HEART RATE: 87 BPM | OXYGEN SATURATION: 99 % | WEIGHT: 177.03 LBS | HEIGHT: 71 IN

## 2021-01-06 DIAGNOSIS — Z43.4 ENCOUNTER FOR ATTENTION TO OTHER ARTIFICIAL OPENINGS OF DIGESTIVE TRACT: ICD-10-CM

## 2021-01-06 PROCEDURE — 47531 INJECTION FOR CHOLANGIOGRAM: CPT

## 2021-01-06 NOTE — ASU DISCHARGE PLAN (ADULT/PEDIATRIC) - ASU DC SPECIAL INSTRUCTIONSFT
Drain Check    Discharge Instructions  - You have had a drain checked.  - Keep the area clean and dry.  - Do not soak in a tub or pool with the drain, however you may shower with the drain and dressing covered in plastic wrap.  - Do not put traction on the drain and be careful that the drain does not get accidentally dislodged or kinked.  - Record output daily from the drain. Empty the bag as needed.  - You may resume your normal diet.  - You may resume your normal medications.  - It is normal to experience some pain over the site for the next few days. You may take apply ice to the area (20 minutes on, 20 minutes off) and take Tylenol for that pain. Do not take more frequently than every 6 hours and do not exceed more than 3000mg of Tylenol in a 24 hour period.    Notify your primary physician and/or Interventional Radiology IMMEDIATELY if you experience any of the following       - Fever of 101F or 38C       - Chills or Rigors/ Shakes       - Swelling and/or Redness in the area of the puncture site       - Worsening Pain       - Blood soaked bandages or worsening bleeding       - Lightheadedness and/or dizziness upon standing       - Chest Pain/ Tightness       - Shortness of Breath       - Difficulty walking    If you have a problem that you believe requires IMMEDIATE attention, please go to your NEAREST Emergency Room. If you believe your problem can safely wait until you speak to a physician, please call Interventional Radiology for any concerns.    Please feel free to contact us at [phone] if any problems arise. After 6PM, Monday through Friday, on weekends and on holidays, please call [phone] and ask for the radiology resident on call to be paged.

## 2021-01-06 NOTE — PROGRESS NOTE ADULT - SUBJECTIVE AND OBJECTIVE BOX
59y/o M with pmhx of cholecystitis s/p cholecystostomy drain on 12/2 here for cholecystostomy tube check prior to OR.       Allergies: No Known Allergies      PAST MEDICAL & SURGICAL HISTORY:  Cholecystitis    Hypercholesterolemia    Diabetes mellitus    No significant past surgical history          Pertinent labs:        Consent: Procedure/risks/ Benefits explained. Informed consent obtained. Pt verbalizes understanding.

## 2021-01-07 ENCOUNTER — RESULT REVIEW (OUTPATIENT)
Age: 61
End: 2021-01-07

## 2021-01-07 ENCOUNTER — APPOINTMENT (OUTPATIENT)
Dept: SURGERY | Facility: HOSPITAL | Age: 61
End: 2021-01-07
Payer: MEDICAID

## 2021-01-07 ENCOUNTER — OUTPATIENT (OUTPATIENT)
Dept: OUTPATIENT SERVICES | Facility: HOSPITAL | Age: 61
LOS: 1 days | End: 2021-01-07
Payer: MEDICAID

## 2021-01-07 VITALS
SYSTOLIC BLOOD PRESSURE: 128 MMHG | DIASTOLIC BLOOD PRESSURE: 71 MMHG | HEART RATE: 93 BPM | OXYGEN SATURATION: 97 % | RESPIRATION RATE: 14 BRPM

## 2021-01-07 VITALS
OXYGEN SATURATION: 99 % | DIASTOLIC BLOOD PRESSURE: 71 MMHG | RESPIRATION RATE: 16 BRPM | TEMPERATURE: 97 F | SYSTOLIC BLOOD PRESSURE: 118 MMHG | HEART RATE: 77 BPM | HEIGHT: 71 IN | WEIGHT: 175.05 LBS

## 2021-01-07 DIAGNOSIS — K80.10 CALCULUS OF GALLBLADDER WITH CHRONIC CHOLECYSTITIS WITHOUT OBSTRUCTION: ICD-10-CM

## 2021-01-07 LAB
GLUCOSE BLDC GLUCOMTR-MCNC: 139 MG/DL — HIGH (ref 70–99)
GLUCOSE BLDC GLUCOMTR-MCNC: 174 MG/DL — HIGH (ref 70–99)

## 2021-01-07 PROCEDURE — 88304 TISSUE EXAM BY PATHOLOGIST: CPT

## 2021-01-07 PROCEDURE — 49570: CPT

## 2021-01-07 PROCEDURE — 82962 GLUCOSE BLOOD TEST: CPT

## 2021-01-07 PROCEDURE — 88304 TISSUE EXAM BY PATHOLOGIST: CPT | Mod: 26

## 2021-01-07 PROCEDURE — 47562 LAPAROSCOPIC CHOLECYSTECTOMY: CPT

## 2021-01-07 PROCEDURE — 47562 LAPAROSCOPIC CHOLECYSTECTOMY: CPT | Mod: 82

## 2021-01-07 PROCEDURE — C1889: CPT

## 2021-01-07 PROCEDURE — 88305 TISSUE EXAM BY PATHOLOGIST: CPT

## 2021-01-07 PROCEDURE — 88305 TISSUE EXAM BY PATHOLOGIST: CPT | Mod: 26

## 2021-01-07 PROCEDURE — C9399: CPT

## 2021-01-07 RX ORDER — SODIUM CHLORIDE 9 MG/ML
1000 INJECTION, SOLUTION INTRAVENOUS
Refills: 0 | Status: DISCONTINUED | OUTPATIENT
Start: 2021-01-07 | End: 2021-01-21

## 2021-01-07 RX ORDER — CHLORHEXIDINE GLUCONATE 213 G/1000ML
1 SOLUTION TOPICAL ONCE
Refills: 0 | Status: DISCONTINUED | OUTPATIENT
Start: 2021-01-07 | End: 2021-01-07

## 2021-01-07 RX ORDER — LIDOCAINE HCL 20 MG/ML
0.2 VIAL (ML) INJECTION ONCE
Refills: 0 | Status: DISCONTINUED | OUTPATIENT
Start: 2021-01-07 | End: 2021-01-07

## 2021-01-07 RX ORDER — SODIUM CHLORIDE 9 MG/ML
3 INJECTION INTRAMUSCULAR; INTRAVENOUS; SUBCUTANEOUS EVERY 8 HOURS
Refills: 0 | Status: DISCONTINUED | OUTPATIENT
Start: 2021-01-07 | End: 2021-01-07

## 2021-01-07 RX ORDER — HYDROMORPHONE HYDROCHLORIDE 2 MG/ML
0.5 INJECTION INTRAMUSCULAR; INTRAVENOUS; SUBCUTANEOUS
Refills: 0 | Status: DISCONTINUED | OUTPATIENT
Start: 2021-01-07 | End: 2021-01-07

## 2021-01-07 RX ORDER — OXYCODONE HYDROCHLORIDE 5 MG/1
1 TABLET ORAL
Qty: 18 | Refills: 0
Start: 2021-01-07 | End: 2021-01-09

## 2021-01-07 RX ORDER — ONDANSETRON 8 MG/1
4 TABLET, FILM COATED ORAL ONCE
Refills: 0 | Status: DISCONTINUED | OUTPATIENT
Start: 2021-01-07 | End: 2021-01-07

## 2021-01-07 RX ORDER — OXYCODONE HYDROCHLORIDE 5 MG/1
5 TABLET ORAL EVERY 4 HOURS
Refills: 0 | Status: DISCONTINUED | OUTPATIENT
Start: 2021-01-07 | End: 2021-01-07

## 2021-01-07 NOTE — ASU DISCHARGE PLAN (ADULT/PEDIATRIC) - ASU DC SPECIAL INSTRUCTIONSFT
may remove dressing over previous drain site tomorrow - can put bacitracin covered by dressing change daily - You may shower, remove dressing prior to showering. Let soap and water run over incision, pat dry after and replace bacitracin and dry gauze       let soap water run over the surgical incisions which have little white bandaids called steristrips over them- pat dry after showering they will fall off on own in about 1 week

## 2021-01-07 NOTE — BRIEF OPERATIVE NOTE - OPERATION/FINDINGS
Percutaneous cholecystostomy tube removed at the beginning of the case. Moderate adhesions of omentum and duodenum to the gallbladder, taken down with hook and bluntly. Cystic duct and artery identified, artery running anterior to duct, clipped with 5mm hemolock clips. posterior H branch clipped with metal clip. cystic duct clipped with 10mm hemolock clips. gallbladder taken off liver bed with hook. chana applied to dry liver bed and omentum. umbilical incision closed with two figure of 8 vicryl.    perc shukri tube site - bacitracin applied, tegederm on top.

## 2021-01-07 NOTE — ASU DISCHARGE PLAN (ADULT/PEDIATRIC) - CALL YOUR DOCTOR IF YOU HAVE ANY OF THE FOLLOWING:
Pain not relieved by Medications/Fever greater than (need to indicate Fahrenheit or Celsius)/Nausea and vomiting that does not stop/Unable to urinate/Inability to tolerate liquids or foods

## 2021-01-07 NOTE — ASU DISCHARGE PLAN (ADULT/PEDIATRIC) - CARE PROVIDER_API CALL
Yesica Conte  COLON/RECTAL SURGERY  [address] [place] [address]  Phone: [phone]  Fax: [phone]  Follow Up Time: 1 week

## 2021-01-11 DIAGNOSIS — K81.9 CHOLECYSTITIS, UNSPECIFIED: ICD-10-CM

## 2021-01-11 DIAGNOSIS — Z46.59 ENCOUNTER FOR FITTING AND ADJUSTMENT OF OTHER GASTROINTESTINAL APPLIANCE AND DEVICE: ICD-10-CM

## 2021-01-13 LAB — SURGICAL PATHOLOGY STUDY: SIGNIFICANT CHANGE UP

## 2021-01-20 ENCOUNTER — APPOINTMENT (OUTPATIENT)
Dept: SURGERY | Facility: CLINIC | Age: 61
End: 2021-01-20
Payer: MEDICAID

## 2021-01-20 VITALS
RESPIRATION RATE: 16 BRPM | WEIGHT: 177 LBS | DIASTOLIC BLOOD PRESSURE: 78 MMHG | TEMPERATURE: 97.5 F | SYSTOLIC BLOOD PRESSURE: 157 MMHG | HEIGHT: 70 IN | OXYGEN SATURATION: 98 % | HEART RATE: 82 BPM | BODY MASS INDEX: 25.34 KG/M2

## 2021-01-20 DIAGNOSIS — Z09 ENCOUNTER FOR FOLLOW-UP EXAMINATION AFTER COMPLETED TREATMENT FOR CONDITIONS OTHER THAN MALIGNANT NEOPLASM: ICD-10-CM

## 2021-01-20 DIAGNOSIS — Z43.4 ENCOUNTER FOR ATTENTION TO OTHER ARTIFICIAL OPENINGS OF DIGESTIVE TRACT: ICD-10-CM

## 2021-01-20 DIAGNOSIS — K80.10 CALCULUS OF GALLBLADDER WITH CHRONIC CHOLECYSTITIS W/OUT OBSTRUCTION: ICD-10-CM

## 2021-01-20 DIAGNOSIS — Z87.898 PERSONAL HISTORY OF OTHER SPECIFIED CONDITIONS: ICD-10-CM

## 2021-01-20 PROCEDURE — 99024 POSTOP FOLLOW-UP VISIT: CPT

## 2021-01-22 ENCOUNTER — TRANSCRIPTION ENCOUNTER (OUTPATIENT)
Age: 61
End: 2021-01-22

## 2021-01-22 PROBLEM — Z09 POSTOPERATIVE EXAMINATION: Status: ACTIVE | Noted: 2021-01-19

## 2021-01-22 PROBLEM — Z87.898 HISTORY OF ABDOMINAL PAIN: Status: RESOLVED | Noted: 2020-11-30 | Resolved: 2021-01-22

## 2021-01-22 PROBLEM — K80.10 CHOLELITHIASIS WITH CHRONIC CHOLECYSTITIS: Status: RESOLVED | Noted: 2020-12-14 | Resolved: 2021-01-22

## 2021-01-22 PROBLEM — Z43.4 CHOLECYSTOSTOMY CARE: Status: RESOLVED | Noted: 2020-12-14 | Resolved: 2021-01-22

## 2021-01-22 NOTE — HISTORY OF PRESENT ILLNESS
[No Pertinent Cardiac History] : no history of aortic stenosis, atrial fibrillation, coronary artery disease, recent myocardial infarction, or implantable device/pacemaker [No Pertinent Pulmonary History] : no history of asthma, COPD, sleep apnea, or smoking [No Adverse Anesthesia Reaction] : no adverse anesthesia reaction in self or family member [Diabetes] : diabetes [(Patient denies any chest pain, claudication, dyspnea on exertion, orthopnea, palpitations or syncope)] : Patient denies any chest pain, claudication, dyspnea on exertion, orthopnea, palpitations or syncope [Good (7-10 METs)] : Good (7-10 METs) [Chronic Anticoagulation] : no chronic anticoagulation [Chronic Kidney Disease] : no chronic kidney disease [FreeTextEntry1] : 1/7/2021 [FreeTextEntry2] : 1/7/2021 [FreeTextEntry3] : Dr. Yesica Conte [FreeTextEntry4] : Pt is a 61 yo M w/ PMHx DM2 on oral agents, recent hospitalization in December for cholecystitis s/p perc cholecystostomy presenting for pre-op clearance prior to laparoscopic cholecystectomy. Pt will also have drain removed with IR tomorrow.\par Pt denies CP, SOB, KOVACS, f/c, abdominal pain, LE swelling. [FreeTextEntry7] : EKG 1/5/2021: NSR, no evidence of ischemia

## 2021-01-22 NOTE — HISTORY OF PRESENT ILLNESS
[de-identified] : Miguel is a 60 year old female here  for a post operative visit. S/P Laparoscopic cholecystectomy  01/07/21. \par Pathology:  Gallbladder lymph node, excision- One benign lymph node.2. Gallbladder, cholecystectomy- Acute and chronic cholecystitis. Body of the report states no stones. \par Today the patient reports feeling well.  He denies pain or wound problems.  Only took 1 oxycodone postop.  Tolerating p.o. well.  Has daily regular BMs.  Very happy with the surgery results.\par \par

## 2021-01-22 NOTE — REASON FOR VISIT
[Post Op: _________] : a [unfilled] post op visit [FreeTextEntry1] :  Laparoscopic cholecystectomy  01/07/21

## 2021-01-22 NOTE — REVIEW OF SYSTEMS
[Fever] : no fever [Chills] : no chills [Recent Change In Weight] : ~T no recent weight change [Vision Problems] : no vision problems [Chest Pain] : no chest pain [Palpitations] : no palpitations [Lower Ext Edema] : no lower extremity edema [Orthopena] : no orthopnea [Shortness Of Breath] : no shortness of breath [Cough] : no cough [Dyspnea on Exertion] : not dyspnea on exertion [Abdominal Pain] : no abdominal pain [Diarrhea] : no diarrhea [Vomiting] : no vomiting [Heartburn] : no heartburn [Dysuria] : no dysuria [Joint Pain] : no joint pain [Skin Rash] : no skin rash [Headache] : no headache

## 2021-01-25 ENCOUNTER — RX RENEWAL (OUTPATIENT)
Age: 61
End: 2021-01-25

## 2021-02-10 DIAGNOSIS — K29.70 GASTRITIS, UNSPECIFIED, W/OUT BLEEDING: ICD-10-CM

## 2021-02-28 DIAGNOSIS — Z01.818 ENCOUNTER FOR OTHER PREPROCEDURAL EXAMINATION: ICD-10-CM

## 2021-03-02 ENCOUNTER — APPOINTMENT (OUTPATIENT)
Dept: DISASTER EMERGENCY | Facility: CLINIC | Age: 61
End: 2021-03-02

## 2021-03-04 LAB — SARS-COV-2 N GENE NPH QL NAA+PROBE: NOT DETECTED

## 2021-03-05 ENCOUNTER — OUTPATIENT (OUTPATIENT)
Dept: OUTPATIENT SERVICES | Facility: HOSPITAL | Age: 61
LOS: 1 days | End: 2021-03-05
Payer: MEDICAID

## 2021-03-05 ENCOUNTER — APPOINTMENT (OUTPATIENT)
Dept: GASTROENTEROLOGY | Facility: HOSPITAL | Age: 61
End: 2021-03-05
Payer: MEDICAID

## 2021-03-05 ENCOUNTER — RESULT REVIEW (OUTPATIENT)
Age: 61
End: 2021-03-05

## 2021-03-05 VITALS
HEART RATE: 82 BPM | DIASTOLIC BLOOD PRESSURE: 76 MMHG | RESPIRATION RATE: 16 BRPM | OXYGEN SATURATION: 99 % | TEMPERATURE: 97 F | WEIGHT: 177.91 LBS | HEIGHT: 71 IN | SYSTOLIC BLOOD PRESSURE: 140 MMHG

## 2021-03-05 VITALS
SYSTOLIC BLOOD PRESSURE: 111 MMHG | DIASTOLIC BLOOD PRESSURE: 70 MMHG | OXYGEN SATURATION: 98 % | HEART RATE: 66 BPM | RESPIRATION RATE: 8 BRPM

## 2021-03-05 DIAGNOSIS — K29.70 GASTRITIS, UNSPECIFIED, WITHOUT BLEEDING: ICD-10-CM

## 2021-03-05 LAB — GLUCOSE BLDC GLUCOMTR-MCNC: 146 MG/DL — HIGH (ref 70–99)

## 2021-03-05 PROCEDURE — 88342 IMHCHEM/IMCYTCHM 1ST ANTB: CPT | Mod: 26

## 2021-03-05 PROCEDURE — 43259 EGD US EXAM DUODENUM/JEJUNUM: CPT

## 2021-03-05 PROCEDURE — 88341 IMHCHEM/IMCYTCHM EA ADD ANTB: CPT

## 2021-03-05 PROCEDURE — 43239 EGD BIOPSY SINGLE/MULTIPLE: CPT

## 2021-03-05 PROCEDURE — 88341 IMHCHEM/IMCYTCHM EA ADD ANTB: CPT | Mod: 26

## 2021-03-05 PROCEDURE — 82962 GLUCOSE BLOOD TEST: CPT

## 2021-03-05 PROCEDURE — 88305 TISSUE EXAM BY PATHOLOGIST: CPT | Mod: 26

## 2021-03-05 PROCEDURE — 88305 TISSUE EXAM BY PATHOLOGIST: CPT

## 2021-03-05 RX ORDER — ATORVASTATIN CALCIUM 80 MG/1
1 TABLET, FILM COATED ORAL
Qty: 0 | Refills: 0 | DISCHARGE

## 2021-03-05 RX ORDER — ACETAMINOPHEN 500 MG
2 TABLET ORAL
Qty: 0 | Refills: 0 | DISCHARGE

## 2021-03-05 RX ORDER — METFORMIN HYDROCHLORIDE 850 MG/1
1 TABLET ORAL
Qty: 0 | Refills: 0 | DISCHARGE

## 2021-03-05 RX ORDER — SODIUM CHLORIDE 9 MG/ML
500 INJECTION INTRAMUSCULAR; INTRAVENOUS; SUBCUTANEOUS ONCE
Refills: 0 | Status: COMPLETED | OUTPATIENT
Start: 2021-03-05 | End: 2021-03-05

## 2021-03-05 RX ORDER — SODIUM CHLORIDE 9 MG/ML
500 INJECTION, SOLUTION INTRAVENOUS
Refills: 0 | Status: DISCONTINUED | OUTPATIENT
Start: 2021-03-05 | End: 2021-03-05

## 2021-03-05 RX ORDER — GLIMEPIRIDE 1 MG
1.5 TABLET ORAL
Qty: 0 | Refills: 0 | DISCHARGE

## 2021-03-05 RX ADMIN — SODIUM CHLORIDE 10 MILLILITER(S): 9 INJECTION INTRAMUSCULAR; INTRAVENOUS; SUBCUTANEOUS at 09:21

## 2021-03-05 NOTE — PRE PROCEDURE NOTE - PRE PROCEDURE EVALUATION
Pre-Endoscopy Evaluation      Referring Physician:   Travis                                 Procedure: EGD    Indication for Procedure: gastritis, pancreatic lesion    Pertinent History:    Sedation by Anesthesia [ ]    PAST MEDICAL & SURGICAL HISTORY:  Cholecystitis    Hypercholesterolemia    Diabetes mellitus    No significant past surgical history        PMH of Gastroparesis [ ]  Gastric Surgery [ ]  Gastric Outlet Obstruction [ ]    Allergies    No Known Allergies    Intolerances        Latex allergy: [ ] yes [ ] no    Medications:MEDICATIONS  (STANDING):    MEDICATIONS  (PRN):      Smoking: [ ] yes  [ ] no    AICD/PPM: [ ] yes   [ ] no    Pertinent lab data:                        Physical Examination:  Daily     Daily   Vital Signs Last 24 Hrs  T(C): --  T(F): --  HR: --  BP: --  BP(mean): --  RR: --  SpO2: --    BP:                 HR:                  SPO2:               Temperature:    Constitutional: NAD    HEENT: PERRLA, EOMI,       Neck:  No JVD    Respiratory: CTAB/L    Cardiovascular: S1 and S2    Gastrointestinal: BS+, soft, NT/ND    Extremities: No peripheral edema    Neurological: A/O x 3, no focal deficits    Psychiatric: Normal mood, normal affect    : No Parker    Skin: No rashes    Comments:    ASA Class: I [ ]  II [x]  III [ ]  IV [ ]    The patient is a suitable candidate for the planned procedure unless box checked [ ]  No, explain:

## 2021-03-12 LAB — SURGICAL PATHOLOGY STUDY: SIGNIFICANT CHANGE UP

## 2021-03-19 ENCOUNTER — NON-APPOINTMENT (OUTPATIENT)
Age: 61
End: 2021-03-19

## 2021-03-19 DIAGNOSIS — A04.8 OTHER SPECIFIED BACTERIAL INTESTINAL INFECTIONS: ICD-10-CM

## 2021-03-19 RX ORDER — BISMUTH SUBSALICYLATE CHEWABLE TABLETS/METRONIDAZOLE TABLETS/TETRACYCLINE HYDROCHLORIDE CAPSULES 250-500 MG
KIT ORAL
Qty: 1 | Refills: 0 | Status: DISCONTINUED | COMMUNITY
Start: 2021-03-19 | End: 2021-03-19

## 2021-03-19 RX ORDER — PANTOPRAZOLE 40 MG/1
40 TABLET, DELAYED RELEASE ORAL TWICE DAILY
Qty: 28 | Refills: 0 | Status: DISCONTINUED | COMMUNITY
Start: 2021-03-19 | End: 2021-03-19

## 2021-03-25 RX ORDER — ESOMEPRAZOLE MAGNESIUM 20 MG/1
20 CAPSULE, DELAYED RELEASE ORAL
Qty: 28 | Refills: 0 | Status: DISCONTINUED | COMMUNITY
Start: 2021-03-19 | End: 2021-03-25

## 2021-03-31 ENCOUNTER — RX RENEWAL (OUTPATIENT)
Age: 61
End: 2021-03-31

## 2021-06-29 ENCOUNTER — RX RENEWAL (OUTPATIENT)
Age: 61
End: 2021-06-29

## 2021-07-29 ENCOUNTER — RX RENEWAL (OUTPATIENT)
Age: 61
End: 2021-07-29

## 2021-08-05 ENCOUNTER — APPOINTMENT (OUTPATIENT)
Dept: GASTROENTEROLOGY | Facility: CLINIC | Age: 61
End: 2021-08-05
Payer: MEDICAID

## 2021-08-05 VITALS
OXYGEN SATURATION: 98 % | WEIGHT: 182 LBS | TEMPERATURE: 97.8 F | SYSTOLIC BLOOD PRESSURE: 125 MMHG | HEIGHT: 70 IN | BODY MASS INDEX: 26.05 KG/M2 | DIASTOLIC BLOOD PRESSURE: 70 MMHG | HEART RATE: 76 BPM

## 2021-08-05 DIAGNOSIS — K29.40 CHRONIC ATROPHIC GASTRITIS W/OUT BLEEDING: ICD-10-CM

## 2021-08-05 DIAGNOSIS — Z12.11 ENCOUNTER FOR SCREENING FOR MALIGNANT NEOPLASM OF COLON: ICD-10-CM

## 2021-08-05 DIAGNOSIS — R11.0 NAUSEA: ICD-10-CM

## 2021-08-05 DIAGNOSIS — K29.70 GASTRITIS, UNSPECIFIED, W/OUT BLEEDING: ICD-10-CM

## 2021-08-05 DIAGNOSIS — B96.81 GASTRITIS, UNSPECIFIED, W/OUT BLEEDING: ICD-10-CM

## 2021-08-05 PROCEDURE — 99214 OFFICE O/P EST MOD 30 MIN: CPT

## 2021-08-07 PROBLEM — R11.0 NAUSEA: Status: ACTIVE | Noted: 2021-08-07

## 2021-08-07 PROBLEM — K29.70 GASTRITIS, HELICOBACTER PYLORI: Status: ACTIVE | Noted: 2021-08-07

## 2021-08-07 PROBLEM — K29.40 ATROPHIC GASTRITIS WITHOUT HEMORRHAGE: Status: ACTIVE | Noted: 2021-08-07

## 2021-08-07 PROBLEM — Z12.11 COLON CANCER SCREENING: Status: ACTIVE | Noted: 2021-08-07

## 2021-08-07 RX ORDER — METRONIDAZOLE 250 MG/1
250 TABLET ORAL EVERY 6 HOURS
Qty: 56 | Refills: 0 | Status: DISCONTINUED | COMMUNITY
Start: 2021-03-19 | End: 2021-08-07

## 2021-08-07 RX ORDER — BISMUTH SUBSALICYLATE 262 MG/1
262 TABLET, CHEWABLE ORAL 4 TIMES DAILY
Qty: 14 | Refills: 0 | Status: DISCONTINUED | COMMUNITY
Start: 2021-03-19 | End: 2021-08-07

## 2021-08-07 RX ORDER — TETRACYCLINE HYDROCHLORIDE 500 MG/1
500 CAPSULE ORAL EVERY 6 HOURS
Qty: 56 | Refills: 0 | Status: DISCONTINUED | COMMUNITY
Start: 2021-03-19 | End: 2021-08-07

## 2021-08-07 NOTE — ASSESSMENT
[FreeTextEntry1] : Impression:\par \par #1.  H. pylori associated atrophic gastritis, treated with quadruple therapy\par \par #2.  History of gastric intestinal metaplasia, not detected on mapping biopsies in March 2021.\par \par #3.  History of pancreatitis, most recent endoscopic ultrasound in March 2021 did not demonstrate features of chronic pancreatitis.\par \par #4.  Chronic intermittent nausea, treated with self-induced vomiting which makes him feel better.\par \par #5.  Colon cancer screening, last colonoscopy 2010\par \par Plan:\par \par #1.  Check H. pylori stool antigen, patient to call for results.\par \par #2.  He is due for colonoscopy, last done in 2010 from GI clinic.  Will discuss with him when he call for H.pylori results.\par \par #3.  Follow-up in 6 months.

## 2021-08-07 NOTE — HISTORY OF PRESENT ILLNESS
[FreeTextEntry1] : Patient follows up for H. pylori associated atrophic gastritis and history of acute cholecystitis.\par \par Had inpatient endoscopic ultrasound during an episode of acute cholecystitis for abnormal LFTs.  Demonstrated mild pancreatic parenchymal changes which could represent subacute pancreatitis.\par \par Had outpatient endoscopic ultrasound in March 2021 for surveillance of atrophic gastritis and pancreas.\par \par Biopsies demonstrated H. pylori gastritis.\par Treated with bismuth, metronidazole, tetracycline, PPI quadruple therapy.\par \par Patient feels well, no fevers, chills, sweats, abdominal pain, chronic diarrhea, melena, hematochezia, jaundice, weight loss.  He has occasional nausea for which she relieves this with self-induced vomiting.  He has been doing this since childhood.\par \par CT scan Nov 2020:\par \par LIVER: Within normal limits.\par BILE DUCTS: Normal caliber.\par GALLBLADDER: Cholelithiasis (3-49). Thickened and edematous gallbladder wall with pericholecystic edema.\par SPLEEN: Within normal limits.\par PANCREAS: Within normal limits.\par ADRENALS: Within normal limits.\par KIDNEYS/URETERS: Nonspecific bilateral perinephric fat stranding. Homogeneously enhancing parenchyma. No hydronephrosis or nephrolithiasis.\par \par BLADDER: Minimally distended.\par REPRODUCTIVE ORGANS: Prostate is enlarged.\par \par BOWEL: No bowel obstruction. Appendix is normal. Gastric fundus wall thickening in a setting of adequately distended stomach.\par PERITONEUM: No ascites.\par VESSELS: Within normal limits.\par RETROPERITONEUM/LYMPH NODES: No lymphadenopathy.\par ABDOMINAL WALL: Within normal limits.\par BONES: Degenerative changes. Straightened lumbar lordosis.\par \par IMPRESSION:\par \par Cholelithiasis, acute cholecystitis. No biliary dilatation or visualized radiodense stone in the CBD.\par \par Gastric fundus wall thickening in a setting of adequately distended stomach. Recommended clinical correlation to assess for gastritis or peptic ulcer disease. Consider nonemergent EGD to exclude underlying malignancy.\par \par MRI/MRCP Dec 2020:\par LIVER: Mild diffuse hepatic steatosis. No focal lesion.\par BILE DUCTS: No intra or extrahepatic biliary ductal dilatation. Common bile duct measures 3 mm. Cystic duct is not well visualized.\par GALLBLADDER: Gallbladder sludge. Gallbladder wall thickening, measuring up to 6 mm and mild pericholecystic edema. The gallbladder is filled with bile and sludge to the distal neck where there is an abrupt cut off. The cystic duct is not clearly imaged. An obstructive or other process at the cystic duct cannot be excluded.\par SPLEEN: Within normal limits.\par PANCREAS: Within normal limits.\par ADRENALS: Within normal limits.\par KIDNEYS/URETERS: No hydronephrosis.\par \par VISUALIZED PORTIONS:\par BOWEL: Persistent gastric wall thickening along the greater curvature of the stomach. No bowel obstruction.\par PERITONEUM: No ascites.\par VESSELS: Within normal limits.\par RETROPERITONEUM/LYMPH NODES: There are several mildly prominent periportal lymph nodes the largest measures 1.9 x 1.2 cm (series 5, image 22)\par ABDOMINAL WALL: Within normal limits.\par BONES: Within normal limits.\par \par IMPRESSION:\par \par The gallbladder is filled with bile and sludge to the distal neck where there is an abrupt cut off. The wall is markedly thickened. Findings are suggestive of acute cholecystitis. No gallstones are seen. The cystic duct is not clearly imaged. An obstructive or other process at the cystic duct cannot be excluded.\par \par No choledocholithiasis.\par \par Persistent gastric wall thickening. Evaluation with upper endoscopy is recommended.\par \par \par \par \par Dec 2020\par EGD/EUS:\par Findings:\par      ENDOSCOPIC FINDING: :\par      Non-severe esophagitis with no bleeding was found.\par      Diffuse severe inflammation characterized by congestion (edema), erythema and edema was found \par      in the gastric body and in the gastric antrum. The folds in proximal stomach and body \par      appeared thickened. The stomach was slow to insufflate with gas. Biopsies were taken with a \par      cold forceps for histology (body and antrum placed into separate jars).\par      The examined duodenum was normal. Normal major papilla\par      ENDOSONOGRAPHIC FINDING: :\par      The esophagus, stomach and duodenum were visualized endosonographically.\par      Mild diffuse wall thickening was visualized endosonographically in the gastric body \par      corresponding to the thickened gastric folds on endoscopy. This appeared to primarily be due \par      to mild thickening of the mucosa and submucosa (Layer 3).\par      No perigastric lymphadenopathy seen.\par      There was no sign of significant endosonographic abnormality in the ampulla.\par      The common bile duct was not dilated, and contained no stones or biliary sludge.\par      The pancreas parenchyma was heterogeneous, with mild lobularity in the head and hyperechoic \par      strands throughout. The main pancreatic duct was normal.\par                                                                                                     \par Impression:          EGD:\par                      - Non-severe esophagitis.\par                      - Erythematous gastritis throughout the stomach with thickened folds in the \par                      gastric body as described above. Biopsied as above.\par                      - Normal examined duodenum. Normal major papilla draining clear yellow bile.\par                      EUS:\par                      - Mild diffuse wall thickening was visualized endosonographically in the \par                      gastric body corresponding to the thickened gastric folds on endoscopy. This \par                      appeared to primarily be due to mild thickening of the mucosa and submucosa \par                      (Layer 3). This may be due to a mucosal process such as H. pylori, or less \par                      likely an infiltrative process.\par                      - Normal common bile duct.\par                      - Mildly abnormal pancreas parenchyma, which currently does not meet EUS \par                      criteria for chronic pancreatitis. Cannot rule out subclinical acute \par                      pancreatitis.\par \par Pathology:\par Final Diagnosis\par 1. Stomach, antrum, biopsy\par - Gastric corporoantral mucosa with extensive intestinal\par metaplasia and chronic inactive gastritis\par - Negative for Helicobacter microorganisms (Warthin-Starry\par stain)\par - Negative for dysplasia\par \par 2. Stomach, body, biopsy\par - Gastric oxyntic mucosa with intestinal metaplasia and\par chronic inactive gastritis\par - Negative for Helicobacter microorganisms (Warthin-Starry\par stain)\par - Negative for dysplasia\par \par \par \par \par March 2021\par EGD/EUS\par \par Findings:\par      ENDOSCOPIC FINDING: :\par      The examined esophagus was normal.\par      Diffuse atrophic mucosa was found in the stomach. Biopsies were taken with a cold forceps for \par      histology. This was done for intestinal metaplasia and was done in four regions two "bites" \par      in each.\par      Localized moderately congested and erythematus mucosa (normal folds in the setting of \par      surrounding atrophic gastritris) was found in the gastric body. Biopsies were taken with a \par      cold forceps for histology.\par      The examined duodenum was normal.\par      ENDOSONOGRAPHIC FINDING: :\par      Endosonographic images of the stomach were unremarkable. Areas of concern where localized and \par      found to be normal. The gastric folds were normal, less than 3 mm thick with normal gastric \par      layers.\par      Normal pancreas. Normal common bile duct.\par                                                                                                     \par Impression:          EGD:\par                      - Diffusely atrophic gastric mucosa. Mapping biopsies performed as above.\par                      - Erythematous gastric folds of the gastric body. Given the atrophic mucosa \par                      with lack of folds elsewhere in the stomach, these folds are normal and not \par                      thickened. Biopsied.\par                      - Normal examined duodenum.\par                      EUS:\par                      - Endosonographic images of the stomach and erythemous gastric folds were \par                      unremarkable.\par \par Pathology:\par Final Diagnosis\par 1. Stomach, antrum, biopsy:\par - Gastric antral type mucosa with chronic moderately active\par gastritis\par - Numerous Helicobacter microorganisms are present\par - No evidence of intestinal metaplasia\par \par 2. Stomach, incisura, biopsy:\par - Gastric antral type mucosa with chronic moderately active\par gastritis\par - Helicobacter microorganisms are present\par - No evidence of intestinal metaplasia\par \par 3. Stomach, body,  biopsy:\par - Gastric body type mucosa with chronic active gastritis\par - Helicobacter microorganisms are present\par - No evidence of intestinal metaplasia\par \par 4. Stomach, fundus, biopsy:\par - Gastric body type mucosa with chronic active gastritis\par - Helicobacter microorganisms are present\par - No evidence of intestinal metaplasia\par \par 5. Gastric folds, body, biopsy:\par - Gastric body type mucosa with chronic moderately active\par gastritis\par - Numerous Helicobacter microorganisms are present\par - No evidence of intestinal metaplasia\par \par

## 2021-08-07 NOTE — CONSULT LETTER
[Dear  ___] : Dear  [unfilled], [Consult Letter:] : I had the pleasure of evaluating your patient, [unfilled]. [Please see my note below.] : Please see my note below. [Consult Closing:] : Thank you very much for allowing me to participate in the care of this patient.  If you have any questions, please do not hesitate to contact me. [Sincerely,] : Sincerely, [FreeTextEntry3] : Hanesl Robles MD, MPH, CRISTINOGE\par Chief of Clinical Quality in Gastroenterology, [facility]\par Associate Chief of Gastroenterology, [facility]\par Director of Endoscopic Ultrasound, [facility]\par [address], [address]\par [facility], [address]\par 24 hours [phone]

## 2021-08-07 NOTE — PHYSICAL EXAM
[General Appearance - Alert] : alert [General Appearance - In No Acute Distress] : in no acute distress [Sclera] : the sclera and conjunctiva were normal [Auscultation Breath Sounds / Voice Sounds] : lungs were clear to auscultation bilaterally [Heart Rate And Rhythm] : heart rate was normal and rhythm regular [Bowel Sounds] : normal bowel sounds [Abdomen Soft] : soft [Abdomen Tenderness] : non-tender [] : no hepato-splenomegaly [Abdomen Mass (___ Cm)] : no abdominal mass palpated [Abnormal Walk] : normal gait [FreeTextEntry1] : No confusion [Affect] : the affect was normal

## 2021-08-30 ENCOUNTER — RX RENEWAL (OUTPATIENT)
Age: 61
End: 2021-08-30

## 2021-11-26 ENCOUNTER — RX RENEWAL (OUTPATIENT)
Age: 61
End: 2021-11-26

## 2021-11-29 NOTE — PATIENT PROFILE ADULT - TRANSPORTATION
It sounds like she needs an Xray of the foot.  (If there is a toe fracture, there is not a lot we can do, but if there is a fracture of a bone in the foot, then she might need some kind of intervention.)      She could go to urgent care, or if someone has an opening this afternoon (if she can make it in), or I have 11:30 and 1 PM openings tomorrow.  
LVM for patient with options; most likely will need an xray of her foot. She could go to urgent care; schedule something with one of our providers this afternoon, or Dr Brock has an 11:30 or 1:00 tomorrow.   
Patient states she stubbed her 4th toe on her right foot on the metal part of her bed on Friday. Her toe and top part of her foot are swollen and painful. Patient is able to put some weight on the foot but isn't able to wear shoes other than her \"house shoes.\" She has been elevating and icing her foot a few times a day and said the swelling goes down a bit after that. She took a Tramadol last night (had left over from dentist) but is now out. Advised patient to take Ibuprofen (has script) as directed and could try diclofenac gel as well. Offered appointment but patient wanted to see what Dr Brock recommends first. Please advise.   
Pt would like to talk with a nurse in regards to her right foot being swollen  After she hit it on the metal part of her bed.   
no

## 2021-12-27 ENCOUNTER — RX RENEWAL (OUTPATIENT)
Age: 61
End: 2021-12-27

## 2022-02-03 ENCOUNTER — APPOINTMENT (OUTPATIENT)
Dept: GASTROENTEROLOGY | Facility: CLINIC | Age: 62
End: 2022-02-03

## 2022-04-21 ENCOUNTER — OUTPATIENT (OUTPATIENT)
Dept: OUTPATIENT SERVICES | Facility: HOSPITAL | Age: 62
LOS: 1 days | End: 2022-04-21
Payer: MEDICAID

## 2022-04-21 ENCOUNTER — APPOINTMENT (OUTPATIENT)
Dept: INTERNAL MEDICINE | Facility: CLINIC | Age: 62
End: 2022-04-21
Payer: MEDICAID

## 2022-04-21 VITALS
HEIGHT: 70 IN | OXYGEN SATURATION: 97 % | DIASTOLIC BLOOD PRESSURE: 74 MMHG | SYSTOLIC BLOOD PRESSURE: 134 MMHG | BODY MASS INDEX: 27.2 KG/M2 | WEIGHT: 190 LBS | HEART RATE: 82 BPM

## 2022-04-21 DIAGNOSIS — I10 ESSENTIAL (PRIMARY) HYPERTENSION: ICD-10-CM

## 2022-04-21 LAB — HBA1C MFR BLD HPLC: 7.3

## 2022-04-21 PROCEDURE — 80053 COMPREHEN METABOLIC PANEL: CPT

## 2022-04-21 PROCEDURE — G0463: CPT | Mod: 25

## 2022-04-21 PROCEDURE — 87389 HIV-1 AG W/HIV-1&-2 AB AG IA: CPT

## 2022-04-21 PROCEDURE — 80061 LIPID PANEL: CPT

## 2022-04-21 PROCEDURE — 99396 PREV VISIT EST AGE 40-64: CPT | Mod: GC

## 2022-04-21 PROCEDURE — 83036 HEMOGLOBIN GLYCOSYLATED A1C: CPT

## 2022-04-21 NOTE — HISTORY OF PRESENT ILLNESS
[FreeTextEntry1] : CPE [de-identified] : 61M w/PMHx of T2DM, HLD, s/p txt H pylori presenting for CPE. \par \par T2DM\par -A1c 7.3\par -urine microalbumin normal\par -Opthalmo referral \par -POCT at home 120s-140s intermittently at home(every few days)\par - needs more testing script\par -Medication tolerating well, discussed alternative options regarding medications with patient. Per patient he would like continue current regimen as he has had no hypoglycemic episodes and he likes his regimen. He will reconsider later on.\par \par HLD\par -meds tolerating well \par \par HCM\par CMP, Lipid, HIV\par Colon Ca screening - deferring colonoscopy but amendable to FIT test\par COVID - obtained 1st booster and waiting for second. \par Shingles - amendable to receiving it\par \par Per patient no GERD symptoms since treatment for Hpylori\par \par Offered translation services but patient refused and stated he was comfortable with English.

## 2022-04-21 NOTE — ASSESSMENT
[FreeTextEntry1] : 61M w/PMHx of T2DM, HLD, s/p txt H pylori presenting for CPE. \par \par \par #HCM\par CMP, Lipid\par FIT test\par COVID - obtained 1st booster and waiting for second. \par Shingles vaccine ordered\par \par RTC in 1 year for annual \par \par d/w Dr. Heard

## 2022-04-21 NOTE — PHYSICAL EXAM
[No Acute Distress] : no acute distress [Well Nourished] : well nourished [Well Developed] : well developed [Well-Appearing] : well-appearing [Normal Sclera/Conjunctiva] : normal sclera/conjunctiva [EOMI] : extraocular movements intact [No JVD] : no jugular venous distention [No Respiratory Distress] : no respiratory distress  [No Accessory Muscle Use] : no accessory muscle use [Clear to Auscultation] : lungs were clear to auscultation bilaterally [Normal Rate] : normal rate  [Regular Rhythm] : with a regular rhythm [Normal S1, S2] : normal S1 and S2 [No Murmur] : no murmur heard [No Edema] : there was no peripheral edema [Soft] : abdomen soft [Non Tender] : non-tender [Non-distended] : non-distended [No HSM] : no HSM [Comprehensive Foot Exam Normal] : Right and left foot were examined and both feet are normal. No ulcers in either foot. Toes are normal and with full ROM.  Normal tactile sensation with monofilament testing throughout both feet

## 2022-04-21 NOTE — REVIEW OF SYSTEMS
[Fever] : no fever [Chills] : no chills [Fatigue] : no fatigue [Pain] : no pain [Redness] : no redness [Dryness] : no dryness [Vision Problems] : no vision problems [Nasal Discharge] : no nasal discharge [Sore Throat] : no sore throat [Chest Pain] : no chest pain [Palpitations] : no palpitations [Orthopena] : no orthopnea [Shortness Of Breath] : no shortness of breath [Wheezing] : no wheezing [Cough] : no cough [Dyspnea on Exertion] : not dyspnea on exertion [Abdominal Pain] : no abdominal pain [Nausea] : no nausea [Constipation] : no constipation [Diarrhea] : no diarrhea [Vomiting] : no vomiting [Dysuria] : no dysuria [Hematuria] : no hematuria

## 2022-04-21 NOTE — HEALTH RISK ASSESSMENT
[Good] : ~his/her~  mood as  good [Never] : Never [0-4] : 0-4 [1 or 2 (0 pts)] : 1 or 2 (0 points) [Never (0 pts)] : Never (0 points) [No] : In the past 12 months have you used drugs other than those required for medical reasons? No [No falls in past year] : Patient reported no falls in the past year [0] : 2) Feeling down, depressed, or hopeless: Not at all (0) [PHQ-2 Negative - No further assessment needed] : PHQ-2 Negative - No further assessment needed [None] : None [With Significant Other] : lives with significant other [Employed] : employed [Less Than High School] : less than high school [] :  [Sexually Active] : sexually active [Feels Safe at Home] : Feels safe at home [Fully functional (bathing, dressing, toileting, transferring, walking, feeding)] : Fully functional (bathing, dressing, toileting, transferring, walking, feeding) [Fully functional (using the telephone, shopping, preparing meals, housekeeping, doing laundry, using] : Fully functional and needs no help or supervision to perform IADLs (using the telephone, shopping, preparing meals, housekeeping, doing laundry, using transportation, managing medications and managing finances) [Smoke Detector] : smoke detector [Carbon Monoxide Detector] : carbon monoxide detector [Seat Belt] :  uses seat belt [Sunscreen] : uses sunscreen [Audit-CScore] : 0 [de-identified] : Walks around neighborhood 3-4x a week [de-identified] : Morning cookies and coffee; Lunch meat, rice; Dinner - sandwich  [[id number]] : 0 [Change in mental status noted] : No change in mental status noted [Language] : denies difficulty with language [Behavior] : denies difficulty with behavior [Handling Complex Tasks] : denies difficulty handling complex tasks [Reasoning] : denies difficulty with reasoning [Reports changes in hearing] : Reports no changes in hearing [Reports changes in vision] : Reports no changes in vision [Reports changes in dental health] : Reports no changes in dental health [Guns at Home] : no guns at home [de-identified] : m [FreeTextEntry2] :

## 2022-04-21 NOTE — END OF VISIT
[] : Resident [FreeTextEntry3] : Based on duration of pt's DM, secretagogues will be less effective, and other, newer DM have additional beneficial effects. If pt with (+) microalbuminuria, would consider switch to an SGLT-2.

## 2022-04-22 LAB
ALBUMIN SERPL ELPH-MCNC: 5.2 G/DL
ALP BLD-CCNC: 97 U/L
ALT SERPL-CCNC: 23 U/L
ANION GAP SERPL CALC-SCNC: 14 MMOL/L
AST SERPL-CCNC: 25 U/L
BILIRUB SERPL-MCNC: 0.9 MG/DL
BUN SERPL-MCNC: 12 MG/DL
CALCIUM SERPL-MCNC: 10.2 MG/DL
CHLORIDE SERPL-SCNC: 101 MMOL/L
CHOLEST SERPL-MCNC: 140 MG/DL
CO2 SERPL-SCNC: 26 MMOL/L
CREAT SERPL-MCNC: 0.92 MG/DL
EGFR: 94 ML/MIN/1.73M2
GLUCOSE SERPL-MCNC: 101 MG/DL
HDLC SERPL-MCNC: 54 MG/DL
HIV1+2 AB SPEC QL IA.RAPID: NONREACTIVE
LDLC SERPL CALC-MCNC: 67 MG/DL
NONHDLC SERPL-MCNC: 85 MG/DL
POTASSIUM SERPL-SCNC: 4.3 MMOL/L
PROT SERPL-MCNC: 7.7 G/DL
SODIUM SERPL-SCNC: 140 MMOL/L
TRIGL SERPL-MCNC: 90 MG/DL

## 2022-04-25 ENCOUNTER — RX RENEWAL (OUTPATIENT)
Age: 62
End: 2022-04-25

## 2022-04-25 DIAGNOSIS — Z00.00 ENCOUNTER FOR GENERAL ADULT MEDICAL EXAMINATION WITHOUT ABNORMAL FINDINGS: ICD-10-CM

## 2022-04-25 DIAGNOSIS — E11.9 TYPE 2 DIABETES MELLITUS WITHOUT COMPLICATIONS: ICD-10-CM

## 2022-04-25 DIAGNOSIS — E78.5 HYPERLIPIDEMIA, UNSPECIFIED: ICD-10-CM

## 2022-05-01 NOTE — PLAN
[FreeTextEntry1] : Pt is a 59 yo M w/ PMHx DM2 on oral agents, recent hospitalization in December for cholecystitis s/p perc cholecystostomy presenting for pre-op clearance prior to laparoscopic cholecystectomy. \par \par #Pre-op clearance\par - pt is low risk for moderate risk procedure, RCRI 1. No contraindication to going ahead with surgery at this time\par - advised pt to hold medication (metformin, glimepiride, atorvastatin) on morning of surgery. Can restart diabetic agents once starts eating\par - EKG NSR, pre-op labs reviewed\par \par #Cholecystitis, resolved\par - on review of inpatient records, pt was to f/u with GI after d/c for repeat EGD/EUS for evaluation of gastritis and pancreas. provided referral\par \par #HCM\par - pt already received flu shot this season\par \par Kita Benz PGY3\par D/w Dr. Talbert No

## 2022-05-25 ENCOUNTER — RX RENEWAL (OUTPATIENT)
Age: 62
End: 2022-05-25

## 2022-06-24 ENCOUNTER — RX RENEWAL (OUTPATIENT)
Age: 62
End: 2022-06-24

## 2022-08-25 ENCOUNTER — RX RENEWAL (OUTPATIENT)
Age: 62
End: 2022-08-25

## 2022-10-24 ENCOUNTER — RX RENEWAL (OUTPATIENT)
Age: 62
End: 2022-10-24

## 2022-12-22 ENCOUNTER — OUTPATIENT (OUTPATIENT)
Dept: OUTPATIENT SERVICES | Facility: HOSPITAL | Age: 62
LOS: 1 days | End: 2022-12-22
Payer: MEDICAID

## 2022-12-22 ENCOUNTER — APPOINTMENT (OUTPATIENT)
Dept: INTERNAL MEDICINE | Facility: CLINIC | Age: 62
End: 2022-12-22

## 2022-12-22 DIAGNOSIS — Z23 ENCOUNTER FOR IMMUNIZATION: ICD-10-CM

## 2022-12-22 DIAGNOSIS — I10 ESSENTIAL (PRIMARY) HYPERTENSION: ICD-10-CM

## 2022-12-22 LAB — HBA1C MFR BLD HPLC: 7.1

## 2022-12-22 PROCEDURE — 99213 OFFICE O/P EST LOW 20 MIN: CPT | Mod: GE

## 2022-12-22 PROCEDURE — G0008: CPT

## 2022-12-22 PROCEDURE — 83036 HEMOGLOBIN GLYCOSYLATED A1C: CPT

## 2022-12-22 PROCEDURE — 90688 IIV4 VACCINE SPLT 0.5 ML IM: CPT

## 2022-12-22 PROCEDURE — G0463: CPT | Mod: 25

## 2022-12-22 NOTE — END OF VISIT
[] : Resident [FreeTextEntry3] : 61yo M with PMhx of DM who presents for DM follow-up. Had 1 episode of hypoglycemia with glimeperide 30mg so decreased to 20mg. Resident recommended switch to alternative DM therapy but pt refused. Agree with cont glimeperide 20mg given patient refusal to take anything else but would cont conseuling switching to DPP4i or SGLT2i.

## 2022-12-22 NOTE — PHYSICAL EXAM
[No Acute Distress] : no acute distress [Well Nourished] : well nourished [Well Developed] : well developed [Well-Appearing] : well-appearing [Normal Sclera/Conjunctiva] : normal sclera/conjunctiva [No JVD] : no jugular venous distention [No Respiratory Distress] : no respiratory distress  [No Accessory Muscle Use] : no accessory muscle use [Clear to Auscultation] : lungs were clear to auscultation bilaterally [Normal Rate] : normal rate  [Regular Rhythm] : with a regular rhythm [Normal S1, S2] : normal S1 and S2 [No Murmur] : no murmur heard [No Edema] : there was no peripheral edema [] : both feet

## 2022-12-22 NOTE — HISTORY OF PRESENT ILLNESS
[FreeTextEntry1] : RPA [de-identified] : 61M w/PMHx of T2DM, HLD, s/p txt H pylori presenting RPA for T2DM\par \par #T2DM\par -A1c 7.1\par -POCT at home 100s-130s with one episode in 160s\par -metformin 1000mg bid and Glimepiride 2mg qhs\par -additionally had one episode of hypoglycemia to 70s and patient noticed symptoms and took sweets immediately that resolved\par -recounselled on hypoglycemia symptoms\par -patient will call opthalm referral\par \par #HCM\par -patient will obtain colonoscopy\par -amendable to flu shot today\par \par \par Offered translation services but refused as he isn't

## 2022-12-22 NOTE — ASSESSMENT
[FreeTextEntry1] : 61M w/PMHx of T2DM, HLD, s/p txt H pylori presenting RPA for T2DM\par \par #HCM\par -patient will obtain colonoscopy\par -amendable to flu shot today\par -patient will need Prevnar 20 next visit\par \par RTC in April for CPE\par \par d/w Dr. Lloyd

## 2022-12-27 DIAGNOSIS — E11.9 TYPE 2 DIABETES MELLITUS WITHOUT COMPLICATIONS: ICD-10-CM

## 2022-12-27 DIAGNOSIS — Z00.00 ENCOUNTER FOR GENERAL ADULT MEDICAL EXAMINATION WITHOUT ABNORMAL FINDINGS: ICD-10-CM

## 2022-12-27 DIAGNOSIS — Z23 ENCOUNTER FOR IMMUNIZATION: ICD-10-CM

## 2023-01-20 ENCOUNTER — RX RENEWAL (OUTPATIENT)
Age: 63
End: 2023-01-20

## 2023-04-26 ENCOUNTER — LABORATORY RESULT (OUTPATIENT)
Age: 63
End: 2023-04-26

## 2023-04-26 ENCOUNTER — APPOINTMENT (OUTPATIENT)
Dept: INTERNAL MEDICINE | Facility: CLINIC | Age: 63
End: 2023-04-26
Payer: MEDICAID

## 2023-04-26 ENCOUNTER — OUTPATIENT (OUTPATIENT)
Dept: OUTPATIENT SERVICES | Facility: HOSPITAL | Age: 63
LOS: 1 days | End: 2023-04-26
Payer: MEDICAID

## 2023-04-26 VITALS
HEIGHT: 70 IN | SYSTOLIC BLOOD PRESSURE: 116 MMHG | HEART RATE: 75 BPM | OXYGEN SATURATION: 97 % | BODY MASS INDEX: 26.63 KG/M2 | WEIGHT: 186 LBS | DIASTOLIC BLOOD PRESSURE: 78 MMHG

## 2023-04-26 DIAGNOSIS — Z00.00 ENCOUNTER FOR GENERAL ADULT MEDICAL EXAMINATION W/OUT ABNORMAL FINDINGS: ICD-10-CM

## 2023-04-26 DIAGNOSIS — E78.5 HYPERLIPIDEMIA, UNSPECIFIED: ICD-10-CM

## 2023-04-26 DIAGNOSIS — I10 ESSENTIAL (PRIMARY) HYPERTENSION: ICD-10-CM

## 2023-04-26 LAB — HBA1C MFR BLD HPLC: 7

## 2023-04-26 PROCEDURE — 82043 UR ALBUMIN QUANTITATIVE: CPT

## 2023-04-26 PROCEDURE — 82607 VITAMIN B-12: CPT

## 2023-04-26 PROCEDURE — 80061 LIPID PANEL: CPT

## 2023-04-26 PROCEDURE — G0463: CPT

## 2023-04-26 PROCEDURE — 85027 COMPLETE CBC AUTOMATED: CPT

## 2023-04-26 PROCEDURE — 80053 COMPREHEN METABOLIC PANEL: CPT

## 2023-04-26 PROCEDURE — 83036 HEMOGLOBIN GLYCOSYLATED A1C: CPT

## 2023-04-26 PROCEDURE — 84153 ASSAY OF PSA TOTAL: CPT

## 2023-04-26 PROCEDURE — 99396 PREV VISIT EST AGE 40-64: CPT | Mod: GE

## 2023-04-28 LAB
ALBUMIN SERPL ELPH-MCNC: 5.3 G/DL
ALP BLD-CCNC: 92 U/L
ALT SERPL-CCNC: 21 U/L
ANION GAP SERPL CALC-SCNC: 17 MMOL/L
AST SERPL-CCNC: 23 U/L
BILIRUB SERPL-MCNC: 1 MG/DL
BUN SERPL-MCNC: 10 MG/DL
CALCIUM SERPL-MCNC: 10.3 MG/DL
CHLORIDE SERPL-SCNC: 101 MMOL/L
CHOLEST SERPL-MCNC: 127 MG/DL
CO2 SERPL-SCNC: 23 MMOL/L
CREAT SERPL-MCNC: 0.83 MG/DL
CREAT SPEC-SCNC: 31 MG/DL
EGFR: 98 ML/MIN/1.73M2
ESTIMATED AVERAGE GLUCOSE: 151 MG/DL
GLUCOSE SERPL-MCNC: 106 MG/DL
HBA1C MFR BLD HPLC: 6.9 %
HCT VFR BLD CALC: 45.9 %
HDLC SERPL-MCNC: 57 MG/DL
HGB BLD-MCNC: 15.5 G/DL
LDLC SERPL CALC-MCNC: 54 MG/DL
MCHC RBC-ENTMCNC: 30.2 PG
MCHC RBC-ENTMCNC: 33.8 GM/DL
MCV RBC AUTO: 89.5 FL
MICROALBUMIN 24H UR DL<=1MG/L-MCNC: <1.2 MG/DL
MICROALBUMIN/CREAT 24H UR-RTO: NORMAL MG/G
NONHDLC SERPL-MCNC: 70 MG/DL
PLATELET # BLD AUTO: 244 K/UL
POTASSIUM SERPL-SCNC: 4.4 MMOL/L
PROT SERPL-MCNC: 7.5 G/DL
PSA SERPL-MCNC: 0.33 NG/ML
RBC # BLD: 5.13 M/UL
RBC # FLD: 12.9 %
SODIUM SERPL-SCNC: 141 MMOL/L
TESTOST SERPL-MCNC: 384 NG/DL
TRIGL SERPL-MCNC: 83 MG/DL
VIT B12 SERPL-MCNC: 460 PG/ML
WBC # FLD AUTO: 9.8 K/UL

## 2023-04-28 NOTE — PHYSICAL EXAM
[Comprehensive Foot Exam Normal] : Right and left foot were examined and both feet are normal. No ulcers in either foot. Toes are normal and with full ROM.  Normal tactile sensation with monofilament testing throughout both feet [Normal] : normal gait, coordination grossly intact, no focal deficits and deep tendon reflexes were 2+ and symmetric

## 2023-04-30 NOTE — ASSESSMENT
[FreeTextEntry1] : 63Y M w/ DM2 (on PO meds), HLD presents to the clinic for CPE\par \par #DM2 \par A1c: 6.9 (4/26)\par - On home metformin 1000 mg BID; glimepiride 2 mg QD  \par - C/w home meds; FS routinely \par - Check B12 level while on metformin \par - Optho referral \par \par #HLD \par On home atorvastatin 40 mg QD\par - Repeat Lipid panel \par - C/w Lipitor\par \par #Sexual Dysfunction \par - Testosterone level; PSA \par - May consider sildenafil\par \par #HCM \par - CBC, CMP, A1c, Lipid profile\par - GI referral for screening colonoscopy \par \par Case discussed w/ Dr. House

## 2023-04-30 NOTE — HISTORY OF PRESENT ILLNESS
[FreeTextEntry1] : CPE  [de-identified] : 63Y M w/ PMH T2DM (A1c - 7.1; 12/22) on oral meds, HLD presents to the clinic for CPE. Pt reports doing well overall and is in good spirits.\par \par #Type 2 DM \par Endorses intermittently checking FS at home (1-2x per day) with readings mostly between 140-160's. Endorses a good diet (home cooked meals with fruits and veggies). Denies polyuria, polydipsia, chest pain, palpations, nausea, vomiting, or SOB. Denies smoking or alcohol use. \par \par #Sexual Dysfunction \par Endorses progressive sexual dysfunction over the past 3-4 years. Pt reports reduced number of morning erections; difficulty maintaining erections during sexual intercourse and reduced sexual latency. Pt states a few years back, he was able to have erections within 15 minutes after ejaculation but is no longer capable. Also endorses premature ejaculation (ie. ejaculates in < 5 minutes upon sexual stimulation). Pt sexually active with wife and denies dysuria/hematuria or penile discharge. Otherwise, pt adherent with medication.

## 2023-05-01 DIAGNOSIS — E78.5 HYPERLIPIDEMIA, UNSPECIFIED: ICD-10-CM

## 2023-05-01 DIAGNOSIS — Z00.00 ENCOUNTER FOR GENERAL ADULT MEDICAL EXAMINATION WITHOUT ABNORMAL FINDINGS: ICD-10-CM

## 2023-05-01 DIAGNOSIS — E11.9 TYPE 2 DIABETES MELLITUS WITHOUT COMPLICATIONS: ICD-10-CM

## 2023-05-22 ENCOUNTER — RX RENEWAL (OUTPATIENT)
Age: 63
End: 2023-05-22

## 2023-05-25 ENCOUNTER — APPOINTMENT (OUTPATIENT)
Dept: OPHTHALMOLOGY | Facility: CLINIC | Age: 63
End: 2023-05-25
Payer: MEDICAID

## 2023-05-25 ENCOUNTER — NON-APPOINTMENT (OUTPATIENT)
Age: 63
End: 2023-05-25

## 2023-05-25 PROCEDURE — 92004 COMPRE OPH EXAM NEW PT 1/>: CPT

## 2023-06-07 NOTE — ASU PREOP CHECKLIST - HAIR REMOVAL
MENTAL HEALTH PSYCHIATRIC MEDICATION MANAGEMENT     Medical records were reviewed for 5 minutes to aid in diagnostic and treatment plan.    Patient has a history of mental health treatment for:   MDD (major depressive disorder), recurrent episode, moderate (CMD)  (primary encounter diagnosis)  JOSSY (generalized anxiety disorder)  Panic disorder  Insomnia due to mental disorder    Additonally patient has the following medical problems:  Past Medical History:   Diagnosis Date   • Anxiety      Past Surgical History:   Procedure Laterality Date   • Vasectomy  01/05/2018       current medications:  Current Outpatient Medications   Medication Sig Dispense Refill   • propRANolol (INDERAL) 20 MG tablet Take 1 tablet by mouth in the morning and 1 tablet at noon and 1 tablet in the evening. 90 tablet 5   • trazodone (DESYREL) 100 MG tablet Take 1 tablet by mouth at bedtime. 30 tablet 5   • [START ON 6/24/2023] zolpidem (AMBIEN) 5 MG tablet Take 1 tablet by mouth nightly as needed for Sleep. Do not start before June 24, 2023. 30 tablet 3   • sertraline (ZOLOFT) 100 MG tablet Take 2 tablets by mouth daily. 60 tablet 5   • omeprazole (PrilOSEC) 20 MG capsule Take 1 capsule by mouth daily. 30 capsule 1   • cyclobenzaprine (FLEXERIL) 5 MG tablet Take 1 tablet by mouth 3 times daily as needed for Muscle spasms. 30 tablet 0   • atorvastatin (LIPITOR) 10 MG tablet Take 10 mg by mouth at bedtime.     • sildenafil (REVATIO) 20 MG tablet Take 1 tablet one hour prior to activity as directed. 30 tablet 5     No current facility-administered medications for this visit.       primary care doctor:  Chris Welch MD    Allergies:  ALLERGIES:  No Known Allergies    52 year old male with a history of   MDD (major depressive disorder), recurrent episode, moderate (CMD)  (primary encounter diagnosis)  JOSSY (generalized anxiety disorder)  Panic disorder  Insomnia due to mental disorder   who presents for follow up appointment    Today  patient reports over the last month since last appointment:  Stressors:    Energy: Improving  Focus: Improving  Appetite: good  Suicidal/Homicidal Ideation: denies  Auditory/Visual Hallucinations: denies  Med Compliant: yes  Pain: denies  Medication Side Effects: none  Mood: \" Improving \"  Anxiety: Stable  Sleep/nightmares: Improving    Patient consents to phone visit     Patient's identity and physical location confirmed as patient identified name and date of birth and is currently in [place] and provider licensed in [place]    This visit was conducted over the telephone. This patient verbally consents to a telephone visit.      The telephone-only visit was being conducted for the purpose of providing treatment advice. The treatment advice that was being provided was based on what was reported by the patient. Without the patient being seen and evaluated in person, there would be some risk that the information and/or assessment provided by the State mental health facility provider might be incomplete or inaccurate.      MENTAL STATUS EXAM:  Speech: normal rate and tone, able to repeat phrases  Mood: \"Improving\"  Affect: mood congruent  Thought Process: Linear, logical, no flight of ideas, spontaneous thoughts  Associations: intact; no loose /tangential/ circumstantial associations  Thought content: no suicidal thoughts, homicidal thoughts or psychotic symptoms such as auditory/visual hallucinations, paranoia, delusions  Insight and judgement: good  Orientation: alert and oriented to person, place, time, and situation.  Behavior: calm, cooperative  Memory: fair, good fund of knowledge  Concentration: fair  Abstraction: able to abstract?  Neuro:  normal speech     AIMS score = 0   Pt feels that current treatment is: \"okay\"       Pt denies:  fever  weakness  m. stiffness or pain or spasms   tremors  fatigue  high Blood pressure  loss of consciousness  seizure disorder  memory loss  chest pain  tachycardia  shortness of  breath  syncope  blurry vision  weight change  headache or migraines  nausea or vomiting  gastrointestinal problems or discomfort  genitourinary problems or discomfort  pain    LABS:  No results found for: HGBA1C  No components found for: GLU0T  CHOLESTEROL (mg/dL)   Date Value   07/13/2017 196     HDL (mg/dL)   Date Value   07/13/2017 44     CHOL/HDL (no units)   Date Value   07/13/2017 4.5 (H)     TRIGLYCERIDE (mg/dL)   Date Value   07/13/2017 117     CALCULATED LDL (mg/dL)   Date Value   07/13/2017 129     No results found for: TSH  Results for orders placed or performed in visit on 12/29/16   Electrocardiogram 12-Lead   Result Value Ref Range    Ventricular Rate EKG/Min (BPM) 62     Atrial Rate (BPM) 62     SD-Interval (MSEC) 192     QRS-Interval (MSEC) 90     QT-Interval (MSEC) 392     QTc 397     P Axis (Degrees) 60     R Axis (Degrees) 81     T Axis (Degrees) 55     REPORT TEXT       Normal sinus rhythm  Normal ECG  No previous ECGs available  Confirmed by VENUS MUIR, GE ([id number]) on 1/3/2017 7:12:08 PM       No results found for: WBC  No results found for: RBC  No results found for: HCT  No results found for: HGB  No results found for: PLT  Sodium (mmol/L)   Date Value   03/07/2023 141     Potassium (mmol/L)   Date Value   03/07/2023 3.9     Chloride (mmol/L)   Date Value   03/07/2023 105     Glucose (mg/dL)   Date Value   03/07/2023 98     Calcium (mg/dL)   Date Value   03/07/2023 8.3 (L)     Carbon Dioxide (mmol/L)   Date Value   03/07/2023 28     BUN (mg/dL)   Date Value   03/07/2023 11     Creatinine (mg/dL)   Date Value   03/07/2023 1.03     GOT/AST (Units/L)   Date Value   02/27/2023 12     GPT/ALT (Units/L)   Date Value   02/27/2023 27     No results found for: GGTP  Alkaline Phosphatase (Units/L)   Date Value   02/27/2023 79     Bilirubin, Total (mg/dL)   Date Value   02/27/2023 0.6     Albumin (g/dL)   Date Value   02/27/2023 4.2     No results found for: TP  No results found for: GFRNA  No results  found for: GFRANG     Comprehensive Past/Family/Social history which was performed during initial evaluation was reexamined and reviewed with patient. For further details, please refer to my initial evaluation note in this chart as well as below for updates.    VS:   There were no vitals filed for this visit.      There were no vitals filed for this visit.    Living with: 2 kids  Family Support: Girlfriend and siblings  Hobbies: Spending time with family and working on cars and watching movies    FAMILY HISTORY   drugs and alcohol:  dad alcohol       mental illness: Dad depression and anxiety     Suicide: Denies    Sudden cardiac death: denies    PSYCHIATRIC HISTORY  Inpatient: denies  Outpatient: Dr. Yoli Gomez  Previous diagnoses:  depression and anxiety  History of suicide attempts: denies  Weapons: denies  Past treatment for alcohol/drugs: Denies  Trauma/Abuse: Denies  Legal History: Denies    IMPRESSION:   52 year old, male with   MDD (major depressive disorder), recurrent episode, moderate (CMD)  (primary encounter diagnosis)  JOSSY (generalized anxiety disorder)  Panic disorder  Insomnia due to mental disorder   who presents for follow up.         Patient with improving depression and symptoms of Fatigue, Distractibility, Anhedonia, Guilt, and insomnia. Pt denies suicidal thoughts.    Patient with unstable generalized anxiety disorder and symptoms of worries, muscle tension, irritability, fatigue, and insomnia.     Patient with improving panic disorder and symptoms of shortness of breath, fear of dying, tremors, palpitations, and sweats  .     This patient is not suicidal, nor is pt homicidal. Pt is not gravely disabled. Inpatient admission is not appropriate at this time.    Past psych meds:   Remeron  Celexa  Lexapro  Temazepam    Venlafaxine    Xanax  Buspar     RECOMMENDATIONS     -Meds:   Zoloft 200 mg p.o. q.d.     trazodone 100 mg po qhs     ambien 5 mg po qhs prn insomnia  Propranolol 20 mg po tid            - Individual Therapy: Nikolas nesbitt  Patient is not currently engaged in individual psychotherapy but is interested (referral provided)      Substance use:  Tobacco-  denies  Alcohol- social (denies history of withdrawal symptoms, blackouts, seizure)  Drugs- denies    Laboratory testing:   Reviewed   EKG, lipid  Next pending 4/20    Master treatment plan due 6/24    Patient was informed about possible symptoms of [place] such as distractibility, indiscretion, fast speech, decreased sleep for days, increased energy, and flight of ideas. Patient was informed that this could be a possible side effect of antidepressant and agrees to go to the emergency room/call 911/call crisis hotline/call a family member if he has any of these symptoms. Patient was also informed about increased risk of suicidality, depression, severe skin reactions, seizures, low sodium, SIADH, hepatotoxicity, priapism, extrapyramidal symptoms, and withdrawal symptoms with abrupt discontinuation of antidepressants.    Discussed side effects of Ambien with patient such as dizziness, drowsiness, memory issues, palpitations, depression, suicidal thoughts, aggressive behaviors and patient agrees to call or go to ER with any questions or concerns. Injury or death may result from sleepwalking, sleep driving, and engaging in other activities while not fully awake.      Patient was informed about side effects such as but not limited to hypotension,, arrhythmia, depression, fatigue, nausea, electrolyte abnormalities, respiratory distress with propranolol. Patient agrees to go to the emergency room or contact  for any problems. In diabetic patients, this medication may mask symptoms of low blood sugar and pt instructed to closely monitor blood sugar levels     p    Orders Placed This Encounter   • propRANolol (INDERAL) 20 MG tablet   • trazodone (DESYREL) 100 MG tablet   • zolpidem (AMBIEN) 5 MG tablet   • sertraline (ZOLOFT) 100 MG tablet       -[place]  hair removal not indicated prescription drug-monitoring: Reviewed 6/7/2023      Follow up: 6 months    advised to call this MD during clinic hours with any concerns prior to next appt.  Emergency Room/Urgent Care with Suicidal/Homicidal Ideation or worsening signs/symptoms.  Suicide hotline number provided.  Patient is in agreement with treatment plan.    Prepared to see patient by reviewing last progress note and medication changes, Reviewing for any patient calls in between appointments, reviewing Appropriate laboratory testing, and evaluating for any major events in between appointments. Total time includes obtaining and reviewing history, Performing exam and or evaluation, Counseling and educating patient and our family, ordering laboratory test if necessary, Communicating with other healthcare providers if necessary, electronically dictating and documenting Clinical information, and Interpreting and communicating test results if necessary. When necessary, treatment plan/ conset for treatment/ consent for meds is also updated. Billing decision being made based on medical decision making complexities including multiple diagnostic review and multiple medications management. Time spent with patient was 21 minutes and level of service determined based on MDM.    Suicide risk remains low; Violence risk remains low; No changes from last risk assessment. Please refer to initial evaluation/progress notes.     Safety measures and plan ( warning signs, coping strategies, earlier doctor appointment, go to ER or local urgent care, call crisis hotline, reach out to support system for help, etc ) were discussed.       For any symptoms such as heart palpitations, headaches, increase in blood pressure, patient should contact primary care physician for evaluation and treatment. Pt understands not to drive with any medications which have a sedative side effect as discussed in appointment. If pt is prescribed opiate pain medications they must inform writer  of this as it can effect medical decision making. Patient understands that if patient is on a medication that requires tapering off as there can be severe side effects due to withdrawals, and if patient misses scheduled appointment according to treatment plan, patient needs to self taper off of medications (by half dose 1 week at a time) as we don't prescribe medications if patient misses appointments as there needs to be proper assessment appointment by appointment before further prescriptions. If patient is suddenly out of medications they need to go to the emergency room to be appropriately tapered off especially regarding controlled substances such as benzodiazepines, etc. Pt educated to call office or schedule an earlier appointment if any issues arise and pt in agreement. If pt  is planning pregnancy please inform writer.     Writer discussed with patient at length about patient's diagnosis, indications of treatment, risks and benefits of the treatment, medication side effects (low, moderate, and high risk). Patient aware of alternative treatments available,  higher levels of care, and options of treatment vs non treatment as discussed in initial evaluation. Patient also educated about the potential benefits of the medications--bearing the risk and benefit in mind patient has agreed to take the aforementioned medications. Patient educated about the risks of death and severe adverse side effects in case of use of any psychotropic medications along with any potential amount of alcohol and patient verbalizes understanding of this risk. Patient counseled on need for medication compliance. Patient counseled on the signs and symptoms of serotonin syndrome such as hyperthermia, autonomic instability, rigidity, myoclonus, encephalopathy, and diaphoresis and to dial 911/ER if occur. Patient also verbalizes understanding of probable consequences of not receiving or not being compliant with the proposed  treatment/medications (including but not limited to leading to a hospital visit, ER (Emergency Room) visit or death). Patient educated that the full benefit of the medication may not be seen if the patient is not taking it as prescribed. Patient informed that the duration of treatment is dependent on treatment response and that the desired outcome is complete remission from symptoms--patient also educated that in certain cases wherein symptom episodes recur for a given diagnosis and then remit, that life-long medication treatment is still recommended in this maintenance phase to prevent symptoms from recurring.   Pt understand Pt has the right to withdraw consent to take the prescribed medication at any time. Writer reinforced with the patient the need for compliance with care by reinforcing the importance of keeping regular appointments in order to accomplish therapy goals/safely monitor medications. Writer also emphasized the need for giving at least 24-hour cancellation notice explaining that another patient mat be able to benefit from the vacant appointment time slot. Writer also assisted the patient in verbalizing a plan to maximize committment to treatment/ scheduled appointments by assessing the best time/day of week for appointments, mode of transportation, arranging for  if applicable, and assessing motivation for treatment. Pt was also given an opportunity to ask questions. Risk of dementia with antidepressants and antipsychotics, psychotropics dicussed.    Patient understands that if being prescribed control substances, random urine drug screens can take place and if the drug screen is positive for any substances other than those prescribed (even if it is an old prescription) AND/OR if patient is non-compliant with prescribed substances THEN controlled substances will be ceased. Also, if patient refuses random drug screen, provider may refuse to prescribe controlled substances if there is  suspicion of use of other substances due to the risk of major side effects of mixing multiple substances and provider also may consider discharging patient for noncompliance with treatment plan.  If decision is made by provider to get back on controlled substances, this will not occur until negative urine drug screen is on file.. Patient also understands that if there is a history of substance dependence, and if a relapse occurs, patient is to complete an IOP/PHP dual diagnosis program for substance dependence/mental illness prior to returning back for a follow-up appointment and controlled substances would be ceased or tapered off in this scenario as well until IOP/PHP program is completed. This also holds true for any newly diagnosed substance use disorder.     Patient has capacity to make decisions and voiced understanding and consents to treatment. Medication reconciliation was completed within the realm of my speciality and pertaining to this encounter. I have obtained and reviewed medications and allergy information with the patient. Patient was educated on the importance of maintaining and sharing this information with all healthcare providers.     Orestes Yanes MD

## 2023-07-19 ENCOUNTER — RX RENEWAL (OUTPATIENT)
Age: 63
End: 2023-07-19

## 2023-10-13 ENCOUNTER — OUTPATIENT (OUTPATIENT)
Dept: OUTPATIENT SERVICES | Facility: HOSPITAL | Age: 63
LOS: 1 days | End: 2023-10-13
Payer: MEDICAID

## 2023-10-13 ENCOUNTER — RESULT CHARGE (OUTPATIENT)
Age: 63
End: 2023-10-13

## 2023-10-13 ENCOUNTER — APPOINTMENT (OUTPATIENT)
Dept: INTERNAL MEDICINE | Facility: CLINIC | Age: 63
End: 2023-10-13
Payer: COMMERCIAL

## 2023-10-13 VITALS — DIASTOLIC BLOOD PRESSURE: 60 MMHG | SYSTOLIC BLOOD PRESSURE: 124 MMHG

## 2023-10-13 VITALS
WEIGHT: 189 LBS | OXYGEN SATURATION: 98 % | BODY MASS INDEX: 27.06 KG/M2 | DIASTOLIC BLOOD PRESSURE: 70 MMHG | SYSTOLIC BLOOD PRESSURE: 134 MMHG | HEIGHT: 70 IN | HEART RATE: 83 BPM

## 2023-10-13 DIAGNOSIS — E11.9 TYPE 2 DIABETES MELLITUS W/OUT COMPLICATIONS: ICD-10-CM

## 2023-10-13 DIAGNOSIS — I10 ESSENTIAL (PRIMARY) HYPERTENSION: ICD-10-CM

## 2023-10-13 LAB — HBA1C MFR BLD HPLC: 7.1

## 2023-10-13 PROCEDURE — G0463: CPT | Mod: 25

## 2023-10-13 PROCEDURE — 99214 OFFICE O/P EST MOD 30 MIN: CPT | Mod: 25,GC

## 2023-10-13 PROCEDURE — G0008: CPT

## 2023-10-13 RX ORDER — GLIMEPIRIDE 2 MG/1
2 TABLET ORAL DAILY
Qty: 60 | Refills: 0 | Status: ACTIVE | COMMUNITY
Start: 2018-11-09 | End: 1900-01-01

## 2023-10-13 RX ORDER — OMEPRAZOLE 20 MG/1
20 CAPSULE, DELAYED RELEASE ORAL TWICE DAILY
Qty: 28 | Refills: 0 | Status: DISCONTINUED | COMMUNITY
Start: 2021-03-25 | End: 2023-10-13

## 2023-10-13 RX ORDER — BLOOD SUGAR DIAGNOSTIC
STRIP MISCELLANEOUS
Qty: 1 | Refills: 4 | Status: ACTIVE | COMMUNITY
Start: 2017-02-06 | End: 1900-01-01

## 2023-10-17 DIAGNOSIS — E11.9 TYPE 2 DIABETES MELLITUS WITHOUT COMPLICATIONS: ICD-10-CM

## 2023-10-17 DIAGNOSIS — Z23 ENCOUNTER FOR IMMUNIZATION: ICD-10-CM

## 2023-11-13 ENCOUNTER — RX RENEWAL (OUTPATIENT)
Age: 63
End: 2023-11-13

## 2023-11-13 RX ORDER — BLOOD SUGAR DIAGNOSTIC
STRIP MISCELLANEOUS TWICE DAILY
Qty: 200 | Refills: 4 | Status: ACTIVE | COMMUNITY
Start: 2020-08-03 | End: 1900-01-01

## 2023-12-07 NOTE — PRE-ANESTHESIA EVALUATION ADULT - NSANTHASARD_GEN_ALL_CORE
Per current medication list:  albuterol 108 (90 Base) MCG/ACT inhaler 1 each 0 2022 --    Sig - Route: Inhale 2 puffs into the lungs every 4 hours as needed for Shortness of Breath. - Inhalation      Per patient inhaler at home is  as of 2023    LOV with PCP was on 2023    Ok to proceed with refill below?   2

## 2024-06-25 ENCOUNTER — APPOINTMENT (OUTPATIENT)
Dept: INTERNAL MEDICINE | Facility: CLINIC | Age: 64
End: 2024-06-25

## 2024-07-31 ENCOUNTER — APPOINTMENT (OUTPATIENT)
Dept: INTERNAL MEDICINE | Facility: CLINIC | Age: 64
End: 2024-07-31
Payer: SELF-PAY

## 2024-07-31 ENCOUNTER — OUTPATIENT (OUTPATIENT)
Dept: OUTPATIENT SERVICES | Facility: HOSPITAL | Age: 64
LOS: 1 days | End: 2024-07-31
Payer: MEDICARE

## 2024-07-31 VITALS
OXYGEN SATURATION: 98 % | BODY MASS INDEX: 26.34 KG/M2 | WEIGHT: 184 LBS | SYSTOLIC BLOOD PRESSURE: 140 MMHG | HEIGHT: 70 IN | HEART RATE: 83 BPM | DIASTOLIC BLOOD PRESSURE: 80 MMHG

## 2024-07-31 DIAGNOSIS — Z00.00 ENCOUNTER FOR GENERAL ADULT MEDICAL EXAMINATION W/OUT ABNORMAL FINDINGS: ICD-10-CM

## 2024-07-31 DIAGNOSIS — E78.5 HYPERLIPIDEMIA, UNSPECIFIED: ICD-10-CM

## 2024-07-31 DIAGNOSIS — E11.9 TYPE 2 DIABETES MELLITUS W/OUT COMPLICATIONS: ICD-10-CM

## 2024-07-31 DIAGNOSIS — I10 ESSENTIAL (PRIMARY) HYPERTENSION: ICD-10-CM

## 2024-07-31 PROCEDURE — 82043 UR ALBUMIN QUANTITATIVE: CPT

## 2024-07-31 PROCEDURE — 80053 COMPREHEN METABOLIC PANEL: CPT

## 2024-07-31 PROCEDURE — 87591 N.GONORRHOEAE DNA AMP PROB: CPT

## 2024-07-31 PROCEDURE — 99214 OFFICE O/P EST MOD 30 MIN: CPT | Mod: 25,GC

## 2024-07-31 PROCEDURE — 80061 LIPID PANEL: CPT

## 2024-07-31 PROCEDURE — 90750 HZV VACC RECOMBINANT IM: CPT

## 2024-07-31 PROCEDURE — 83036 HEMOGLOBIN GLYCOSYLATED A1C: CPT

## 2024-07-31 PROCEDURE — 86780 TREPONEMA PALLIDUM: CPT

## 2024-07-31 PROCEDURE — 87491 CHLMYD TRACH DNA AMP PROBE: CPT

## 2024-07-31 PROCEDURE — 90471 IMMUNIZATION ADMIN: CPT

## 2024-07-31 PROCEDURE — 87389 HIV-1 AG W/HIV-1&-2 AB AG IA: CPT

## 2024-07-31 PROCEDURE — G0463: CPT | Mod: 25

## 2024-07-31 RX ORDER — ZOSTER VACCINE RECOMBINANT, ADJUVANTED 50 MCG/0.5
50 KIT INTRAMUSCULAR
Qty: 1 | Refills: 0 | Status: ACTIVE | OUTPATIENT
Start: 2024-07-31

## 2024-07-31 RX ORDER — PNEUMOCOCCAL 20-VALENT CONJUGATE VACCINE 2.2; 2.2; 2.2; 2.2; 2.2; 2.2; 2.2; 2.2; 2.2; 2.2; 2.2; 2.2; 2.2; 2.2; 2.2; 2.2; 4.4; 2.2; 2.2; 2.2 UG/.5ML; UG/.5ML; UG/.5ML; UG/.5ML; UG/.5ML; UG/.5ML; UG/.5ML; UG/.5ML; UG/.5ML; UG/.5ML; UG/.5ML; UG/.5ML; UG/.5ML; UG/.5ML; UG/.5ML; UG/.5ML; UG/.5ML; UG/.5ML; UG/.5ML; UG/.5ML
0.5 INJECTION, SUSPENSION INTRAMUSCULAR
Qty: 1 | Refills: 0 | Status: ACTIVE | OUTPATIENT
Start: 2024-07-31

## 2024-07-31 NOTE — PHYSICAL EXAM
[Normal Outer Ear/Nose] : the outer ears and nose were normal in appearance [Normal Oropharynx] : the oropharynx was normal [No JVD] : no jugular venous distention [Supple] : supple [No Respiratory Distress] : no respiratory distress  [No Accessory Muscle Use] : no accessory muscle use [Clear to Auscultation] : lungs were clear to auscultation bilaterally [No Varicosities] : no varicosities [No Edema] : there was no peripheral edema [No Extremity Clubbing/Cyanosis] : no extremity clubbing/cyanosis [Normal] : affect was normal and insight and judgment were intact [Right Foot Was Examined] : Right foot ~C was examined [Left Foot Was Examined] : left foot ~C was examined [None] : no ulcers in either foot were found [] : left foot [___] : left foot points [unfilled] [de-identified] : Extensive ear wax b/l [de-identified] : Extensive calluses on b/l feet with onycho (especially on big toe). No ulcers [de-identified] : Tested 6 different spots on each feet  b/l and patient had diminished sensation on Left foot with sensation decreased in 3rd and 5th metatarsals

## 2024-07-31 NOTE — ASSESSMENT
[FreeTextEntry1] : # Diabetes -C/w metformin and glimepiride  -Counseled on daily FS and importance of doing it. -RTC every 20 weeks to monitor diabetes and A1c -Low sugar diet recommended. -Medications refilled -Podiatry referral (once a year) for calluses treatment, onychomycosis, and possible diabetic foot complication. - Ophthalmology referral given to r/o diabetic complications. -Urine albumin/cr and A1C  # HLD -C/w atorvastain  #HCM -CMP, lipids, CBC, A1c, albumin/cr urine -STI testing: HIV Ag/Ab, Syphilis, GC/Chlamydia due to new partner -Agreed to Colonoscopy (GI referral given). -Prevnar 20 to be given today. -Shingrix to be given today. -PHQ-9 and DIMITRIS-7 unremarkable. RTC in 20 weeks

## 2024-07-31 NOTE — HISTORY OF PRESENT ILLNESS
[FreeTextEntry1] : CPE [de-identified] : 63 yo M w/ PMH of DM & dyslipidemia who was last seen on 10/13/23 (for DM f/u) here for CPE. Patient has no active issues currently with negative ROS including CP, fever, nausea, vomit, diarrhea, constipation, SOB, and KOVACS. Patient is compliant with all his medications and requests refill for all of them. Patient does not take finger sticks regularly, but says latest was around 170s to 193 before eating meals.  Patient's BP was 140/80 and 138/82 (RUE) on repeat this visit. Patient is feeling great with no stressors. No recent travels or sick contacts. Patient endorses no anxiety. Patient had new sexual partner at beginning of year and wants STI testing.  PMH: Dyslipidemia, GERD, and DM PSH: Cholecystostomy Fam Hx: Mother has DM; father acute MI; HTN in both parents Social Hx: Patient born in [facility] and came to the  36 years ago. Patient works as a  in [place] and lives in [place] with his wife. He has two kids that have moved out. Patient does not take any drugs or use tobacco substances. Denies smoking and drinking. Quit drinking 15 years ago. His diet consists of rice, beans, chicken, barely meats, and occasionally vegetables. Patient states that he is trying to eat less fatty foods and does not eat junk food. Patient works a lot and does not exercise. but states his work is a form of exercise. No current stress. Patient had an affair at the beginning of this year and agrees to STI testing.   Allergies: NKDA Medications: Omeprazole, Metformin, Glimepride, Atorvastatin (same dosage as notes) Vaccines: COVID-19 (4x), annual flu, Tdap (2016), Hep B (3 doses), PPSV23 (12/2011).  PHQ-9 = 0 DIMITRIS-7 = 0

## 2024-07-31 NOTE — HEALTH RISK ASSESSMENT
[Very Good] : ~his/her~ current health as very good [No] : In the past 12 months have you used drugs other than those required for medical reasons? No [No falls in past year] : Patient reported no falls in the past year [0] : 2) Feeling down, depressed, or hopeless: Not at all (0) [PHQ-2 Negative - No further assessment needed] : PHQ-2 Negative - No further assessment needed [Never] : Never [[id number]] : 0

## 2024-08-02 LAB
ALBUMIN SERPL ELPH-MCNC: 5.2 G/DL
ALP BLD-CCNC: 99 U/L
ALT SERPL-CCNC: 20 U/L
ANION GAP SERPL CALC-SCNC: 20 MMOL/L
AST SERPL-CCNC: 23 U/L
BILIRUB SERPL-MCNC: 1 MG/DL
BUN SERPL-MCNC: 11 MG/DL
C TRACH RRNA SPEC QL NAA+PROBE: NOT DETECTED
CALCIUM SERPL-MCNC: 10.2 MG/DL
CHLORIDE SERPL-SCNC: 99 MMOL/L
CHOLEST SERPL-MCNC: 151 MG/DL
CO2 SERPL-SCNC: 24 MMOL/L
CREAT SERPL-MCNC: 0.88 MG/DL
CREAT SPEC-SCNC: 94 MG/DL
EGFR: 95 ML/MIN/1.73M2
ESTIMATED AVERAGE GLUCOSE: 177 MG/DL
GLUCOSE SERPL-MCNC: 122 MG/DL
HBA1C MFR BLD HPLC: 7.8 %
HDLC SERPL-MCNC: 55 MG/DL
HIV1+2 AB SPEC QL IA.RAPID: NONREACTIVE
LDLC SERPL CALC-MCNC: 75 MG/DL
MICROALBUMIN 24H UR DL<=1MG/L-MCNC: 8.3 MG/DL
MICROALBUMIN/CREAT 24H UR-RTO: 88 MG/G
N GONORRHOEA RRNA SPEC QL NAA+PROBE: NOT DETECTED
NONHDLC SERPL-MCNC: 95 MG/DL
POTASSIUM SERPL-SCNC: 3.9 MMOL/L
PROT SERPL-MCNC: 8.1 G/DL
SODIUM SERPL-SCNC: 144 MMOL/L
SOURCE AMPLIFICATION: NORMAL
T PALLIDUM AB SER QL IA: NEGATIVE
TRIGL SERPL-MCNC: 112 MG/DL

## 2024-08-12 DIAGNOSIS — Z00.00 ENCOUNTER FOR GENERAL ADULT MEDICAL EXAMINATION WITHOUT ABNORMAL FINDINGS: ICD-10-CM

## 2024-08-12 DIAGNOSIS — Z23 ENCOUNTER FOR IMMUNIZATION: ICD-10-CM

## 2024-08-12 DIAGNOSIS — E78.5 HYPERLIPIDEMIA, UNSPECIFIED: ICD-10-CM

## 2024-08-12 DIAGNOSIS — E11.9 TYPE 2 DIABETES MELLITUS WITHOUT COMPLICATIONS: ICD-10-CM

## 2024-11-15 ENCOUNTER — OUTPATIENT (OUTPATIENT)
Dept: OUTPATIENT SERVICES | Facility: HOSPITAL | Age: 64
LOS: 1 days | End: 2024-11-15
Payer: MEDICARE

## 2024-11-15 ENCOUNTER — MED ADMIN CHARGE (OUTPATIENT)
Age: 64
End: 2024-11-15

## 2024-11-15 ENCOUNTER — APPOINTMENT (OUTPATIENT)
Dept: INTERNAL MEDICINE | Facility: CLINIC | Age: 64
End: 2024-11-15
Payer: MEDICARE

## 2024-11-15 VITALS
BODY MASS INDEX: 25.77 KG/M2 | WEIGHT: 180 LBS | HEIGHT: 70 IN | OXYGEN SATURATION: 98 % | SYSTOLIC BLOOD PRESSURE: 140 MMHG | DIASTOLIC BLOOD PRESSURE: 88 MMHG | HEART RATE: 82 BPM

## 2024-11-15 DIAGNOSIS — I10 ESSENTIAL (PRIMARY) HYPERTENSION: ICD-10-CM

## 2024-11-15 DIAGNOSIS — E11.9 TYPE 2 DIABETES MELLITUS WITHOUT COMPLICATIONS: ICD-10-CM

## 2024-11-15 DIAGNOSIS — E11.9 TYPE 2 DIABETES MELLITUS W/OUT COMPLICATIONS: ICD-10-CM

## 2024-11-15 DIAGNOSIS — R19.7 DIARRHEA, UNSPECIFIED: ICD-10-CM

## 2024-11-15 DIAGNOSIS — Z43.4 ENCOUNTER FOR ATTENTION TO OTHER ARTIFICIAL OPENINGS OF DIGESTIVE TRACT: ICD-10-CM

## 2024-11-15 DIAGNOSIS — Z23 ENCOUNTER FOR IMMUNIZATION: ICD-10-CM

## 2024-11-15 PROCEDURE — 83036 HEMOGLOBIN GLYCOSYLATED A1C: CPT

## 2024-11-15 PROCEDURE — G0463: CPT

## 2024-11-15 PROCEDURE — 85025 COMPLETE CBC W/AUTO DIFF WBC: CPT

## 2024-11-15 PROCEDURE — 90662 IIV NO PRSV INCREASED AG IM: CPT

## 2024-11-15 PROCEDURE — 99204 OFFICE O/P NEW MOD 45 MIN: CPT | Mod: GC

## 2024-11-15 PROCEDURE — G0008: CPT

## 2024-11-15 PROCEDURE — 99214 OFFICE O/P EST MOD 30 MIN: CPT

## 2024-11-15 RX ORDER — LOSARTAN POTASSIUM 25 MG/1
25 TABLET, FILM COATED ORAL
Qty: 30 | Refills: 2 | Status: ACTIVE | COMMUNITY
Start: 2024-11-15 | End: 1900-01-01

## 2024-11-15 RX ORDER — CHOLESTYRAMINE 4 G/9G
4 POWDER, FOR SUSPENSION ORAL DAILY
Qty: 10 | Refills: 2 | Status: ACTIVE | COMMUNITY
Start: 2024-11-15 | End: 1900-01-01

## 2024-11-18 LAB
BASOPHILS # BLD AUTO: 0.06 K/UL
BASOPHILS NFR BLD AUTO: 0.6 %
EOSINOPHIL # BLD AUTO: 0.14 K/UL
EOSINOPHIL NFR BLD AUTO: 1.4 %
HCT VFR BLD CALC: 45.8 %
HGB BLD-MCNC: 15.1 G/DL
IMM GRANULOCYTES NFR BLD AUTO: 0.2 %
LYMPHOCYTES # BLD AUTO: 1.74 K/UL
LYMPHOCYTES NFR BLD AUTO: 17.6 %
MAN DIFF?: NORMAL
MCHC RBC-ENTMCNC: 29.5 PG
MCHC RBC-ENTMCNC: 33 G/DL
MCV RBC AUTO: 89.6 FL
MONOCYTES # BLD AUTO: 0.95 K/UL
MONOCYTES NFR BLD AUTO: 9.6 %
NEUTROPHILS # BLD AUTO: 6.96 K/UL
NEUTROPHILS NFR BLD AUTO: 70.6 %
PLATELET # BLD AUTO: 248 K/UL
RBC # BLD: 5.11 M/UL
RBC # FLD: 12.8 %
WBC # FLD AUTO: 9.87 K/UL

## 2024-11-19 LAB — HBA1C MFR BLD HPLC: 8

## 2024-11-20 RX ORDER — EMPAGLIFLOZIN, METFORMIN HYDROCHLORIDE 5; 1000 MG/1; MG/1
5-1000 TABLET, EXTENDED RELEASE ORAL
Qty: 60 | Refills: 0 | Status: ACTIVE | COMMUNITY
Start: 2024-11-15 | End: 1900-01-01

## 2024-12-20 ENCOUNTER — RX RENEWAL (OUTPATIENT)
Age: 64
End: 2024-12-20

## 2025-01-27 ENCOUNTER — RX RENEWAL (OUTPATIENT)
Age: 65
End: 2025-01-27

## 2025-02-21 ENCOUNTER — RX RENEWAL (OUTPATIENT)
Age: 65
End: 2025-02-21

## 2025-03-24 ENCOUNTER — RX RENEWAL (OUTPATIENT)
Age: 65
End: 2025-03-24

## 2025-04-17 ENCOUNTER — OUTPATIENT (OUTPATIENT)
Dept: OUTPATIENT SERVICES | Facility: HOSPITAL | Age: 65
LOS: 1 days | End: 2025-04-17
Payer: MEDICARE

## 2025-04-17 ENCOUNTER — APPOINTMENT (OUTPATIENT)
Dept: INTERNAL MEDICINE | Facility: CLINIC | Age: 65
End: 2025-04-17
Payer: MEDICARE

## 2025-04-17 VITALS
BODY MASS INDEX: 24.95 KG/M2 | HEIGHT: 70 IN | HEART RATE: 74 BPM | OXYGEN SATURATION: 97 % | SYSTOLIC BLOOD PRESSURE: 122 MMHG | WEIGHT: 174.25 LBS | DIASTOLIC BLOOD PRESSURE: 84 MMHG

## 2025-04-17 DIAGNOSIS — E11.9 TYPE 2 DIABETES MELLITUS W/OUT COMPLICATIONS: ICD-10-CM

## 2025-04-17 DIAGNOSIS — I10 ESSENTIAL (PRIMARY) HYPERTENSION: ICD-10-CM

## 2025-04-17 LAB — HBA1C MFR BLD HPLC: 8.1

## 2025-04-17 PROCEDURE — 83036 HEMOGLOBIN GLYCOSYLATED A1C: CPT

## 2025-04-17 PROCEDURE — 99213 OFFICE O/P EST LOW 20 MIN: CPT | Mod: GE

## 2025-04-17 PROCEDURE — G2211 COMPLEX E/M VISIT ADD ON: CPT

## 2025-04-17 PROCEDURE — G0463: CPT

## 2025-04-17 PROCEDURE — T1013: CPT

## 2025-04-17 RX ORDER — BLOOD SUGAR DIAGNOSTIC
STRIP MISCELLANEOUS TWICE DAILY
Qty: 1 | Refills: 11 | Status: ACTIVE | COMMUNITY
Start: 2025-04-17 | End: 1900-01-01

## 2025-04-28 DIAGNOSIS — E11.9 TYPE 2 DIABETES MELLITUS WITHOUT COMPLICATIONS: ICD-10-CM

## 2025-08-25 ENCOUNTER — NON-APPOINTMENT (OUTPATIENT)
Age: 65
End: 2025-08-25

## 2025-08-27 ENCOUNTER — APPOINTMENT (OUTPATIENT)
Dept: COLORECTAL SURGERY | Facility: CLINIC | Age: 65
End: 2025-08-27

## 2025-08-27 VITALS
SYSTOLIC BLOOD PRESSURE: 148 MMHG | HEIGHT: 70 IN | WEIGHT: 175 LBS | HEART RATE: 71 BPM | TEMPERATURE: 97.88 F | BODY MASS INDEX: 25.05 KG/M2 | DIASTOLIC BLOOD PRESSURE: 78 MMHG | OXYGEN SATURATION: 98 %

## 2025-08-27 PROCEDURE — 99204 OFFICE O/P NEW MOD 45 MIN: CPT

## 2025-09-11 ENCOUNTER — APPOINTMENT (OUTPATIENT)
Dept: COLORECTAL SURGERY | Facility: CLINIC | Age: 65
End: 2025-09-11
Payer: MEDICARE

## 2025-09-11 DIAGNOSIS — K63.5 POLYP OF COLON: ICD-10-CM

## 2025-09-11 DIAGNOSIS — Z12.11 ENCOUNTER FOR SCREENING FOR MALIGNANT NEOPLASM OF COLON: ICD-10-CM

## 2025-09-11 PROCEDURE — 45380 COLONOSCOPY AND BIOPSY: CPT

## 2025-09-16 LAB — CORE LAB BIOPSY: NORMAL
